# Patient Record
Sex: FEMALE | Race: WHITE
[De-identification: names, ages, dates, MRNs, and addresses within clinical notes are randomized per-mention and may not be internally consistent; named-entity substitution may affect disease eponyms.]

---

## 2016-12-22 LAB
ANION GAP SERPL CALC-SCNC: 8 MMOL/L (ref 3–11)
APPEARANCE UR: CLEAR
BASOPHILS # BLD: 0.04 K/UL (ref 0–0.2)
BASOPHILS NFR BLD: 0.5 %
BILIRUB UR-MCNC: (no result) MG/DL
BUN SERPL-MCNC: 19 MG/DL (ref 7–18)
BUN/CREAT SERPL: 15.4 (ref 10–20)
CALCIUM SERPL-MCNC: 9.1 MG/DL (ref 8.5–10.1)
CHLORIDE SERPL-SCNC: 105 MMOL/L (ref 98–107)
CO2 SERPL-SCNC: 28 MMOL/L (ref 21–32)
COLOR UR: YELLOW
COMPLETE: YES
CREAT CL PREDICTED SERPL C-G-VRATE: 31.4 ML/MIN
CREAT SERPL-MCNC: 1.2 MG/DL (ref 0.6–1.2)
EOSINOPHIL NFR BLD AUTO: 329 K/UL (ref 130–400)
GLUCOSE SERPL-MCNC: 91 MG/DL (ref 70–99)
HCT VFR BLD CALC: 37.6 % (ref 37–47)
IG%: 0.5 %
IMM GRANULOCYTES NFR BLD AUTO: 35.6 %
INR PPP: 0.9 (ref 0.9–1.1)
LYMPHOCYTES # BLD: 3.05 K/UL (ref 1.2–3.4)
MANUAL MICROSCOPIC REQUIRED?: NO
MCH RBC QN AUTO: 30.5 PG (ref 25–34)
MCHC RBC AUTO-ENTMCNC: 33 G/DL (ref 32–36)
MCV RBC AUTO: 92.6 FL (ref 80–100)
MONOCYTES NFR BLD: 11.3 %
NEUTROPHILS # BLD AUTO: 1.8 %
NEUTROPHILS NFR BLD AUTO: 50.3 %
NITRITE UR QL STRIP: (no result)
PARTIAL THROMBOPLASTIN RATIO: 1
PH UR STRIP: 7 [PH] (ref 4.5–7.5)
PMV BLD AUTO: 9.4 FL (ref 7.4–10.4)
POTASSIUM SERPL-SCNC: 4.2 MMOL/L (ref 3.5–5.1)
PROTHROMBIN TIME: 10.1 SECONDS (ref 9–12)
RBC # BLD AUTO: 4.06 M/UL (ref 4.2–5.4)
REVIEW REQ?: NO
SODIUM SERPL-SCNC: 141 MMOL/L (ref 136–145)
SP GR UR STRIP: 1.01 (ref 1–1.03)
URINE BILL WITH OR WITHOUT MIC: (no result)
URINE EPITHELIAL CELL AUTO: (no result) /LPF (ref 0–5)
UROBILINOGEN UR-MCNC: (no result) MG/DL
WBC # BLD AUTO: 8.57 K/UL (ref 4.8–10.8)

## 2016-12-22 NOTE — PAT MEDICATION INSTRUCTIONS
Service Date


Dec 22, 2016.





Current Home Medication List


Aspirin Enteric Coated (Ecotrin Or Generic *), 81 MG PO QPM


Cyclosporine (Ophth) (Restasis), 1 DROP OPB BID


Diclofenac Sodium (Topical) (Voltaren 1% Top Gel), 1 DOSE TOP BID


Escitalopram (Lexapro), 10 MG PO QAM


Fish Oil ([place]-3), 1 CAP PO BID


Ipratropium-Albuterol (Combivent Respimat), 1 PUFFS INH QID PRN for PRN


Ipratropium-Albuterol (Duoneb), 1 TREATMENT INH Q6H PRN for RN


Levothyroxine Sodium (Levothyroxine Sodium), 1 TAB PO QAM


Metoprolol Tartrate (Lopressor) (Lopressor), 25 MG PO BID


Multiple Vitamin (Multivitamin), 1 TAB PO QAM


Simvastatin (Zocor), 40 MG PO HS


Tolterodine Tartrate (Detrol LA), 1 CAP PO HS





Medication Instructions


For Your Scheduled Surgery 








- Hold the following medications 2 weeks prior to surgery:


Fish Oil ([place]-3), 1 CAP PO BID











- Hold the following medications the morning of surgery:


Multiple Vitamin (Multivitamin), 1 TAB PO QAM


Diclofenac Sodium (Topical) (Voltaren 1% Top Gel), 1 DOSE TOP BID














- Take the following medications the morning of surgery with a sip of water 

OTHERWISE NOTHING TO EAT OR DRINK AFTER MIDNIGHT:


Escitalopram (Lexapro), 10 MG PO QAM


Metoprolol Tartrate (Lopressor) (Lopressor), 25 MG PO BID


Cyclosporine (Ophth) (Restasis), 1 DROP OPB BID


Levothyroxine Sodium (Levothyroxine Sodium), 1 TAB PO QAM


Ipratropium-Albuterol (Combivent Respimat), 1 PUFFS INH QID PRN for PRN


Ipratropium-Albuterol (Duoneb), 1 TREATMENT INH Q6H PRN for RN











- Take the following medications as scheduled the night before surgery:


Simvastatin (Zocor), 40 MG PO HS


Aspirin Enteric Coated (Ecotrin Or Generic *), 81 MG PO QPM


Tolterodine Tartrate (Detrol LA), 1 CAP PO  HS


Metoprolol Tartrate (Lopressor) (Lopressor), 25 MG PO BID


Cyclosporine (Ophth) (Restasis), 1 DROP OPB BID


Ipratropium-Albuterol (Combivent Respimat), 1 PUFFS INH QID PRN for PRN


Ipratropium-Albuterol (Duoneb), 1 TREATMENT INH Q6H PRN for RN











If you have any questions please call us at [phone] (Adele Smalls PA-C

)  or [phone] or [phone]

## 2016-12-22 NOTE — DIAGNOSTIC IMAGING REPORT
CHEST PREADMISSION(PA/LAT)



CLINICAL HISTORY: Preoperative chest    



COMPARISON STUDY:  12/8/2016



FINDINGS: The heart is at the upper limits of normal in size. There is a large

air-containing retrocardiac opacity consistent with a hiatal hernia. There is no

focal pulmonary consolidation. There is no failure. There are no pleural

effusions. There are few scattered chronic interstitial type opacities.[ 



IMPRESSION: Hiatal hernia. No acute findings.







Electronically signed by:  Vito Ho M.D.

12/22/2016 11:43 AM

## 2017-01-26 NOTE — HISTORY & PHYSICAL EXAMINATION
DATE OF ADMISSION:  01/31/2017

 

CHIEF COMPLAINT:  Right knee pain, discomfort and instability.

 

HISTORY OF PRESENT ILLNESS:  The patient is an 82-year-old female, quite

independent and lives by herself, who presents for surgical treatment of her

right knee.  She has got a long history of right knee pain and discomfort and

describes it has just gotten worse over time.  She initially responded to

injections and these have become less successful over time.  She has had

several falls and her knee has become more valgus and unstable.  Shots have

become less effective and feels like her knee is going to give out and she

did not actually fall and break her kneecap at one point.  She would now like

to proceed with surgical treatment.  It is hindering her ability to maintain

an independent and active lifestyle.

 

PAST MEDICAL HISTORY:  Significant for:

1.  Elevated cholesterol.

2.  Recent pneumonia, currently resolved.

3.  Hypothyroidism.

4.  Low back pain/sciatica.

5.  Gastroesophageal reflux disease.

6.  Hiatal hernia.

 

PAST SURGICAL HISTORY:  Includes:

1.  Bilateral shoulder surgery.

2.  Back surgery.

3.  Cholecystectomy.

4.  Hysterectomy.

 

ALLERGIES:  SULFA.

 

CURRENT MEDICINES:  Include:

1.  Lexapro.

2.  Restasis ophthalmic ointment.

3.  Multivitamin.

4.  Zostavax.

5.  Fish oil. 

6.  Simvastatin.

7.  Metoprolol.

8.  Levothyroxine.

9.  Tolterodine.

 

SOCIAL HISTORY:  This is an 82-year-old female.  She lives by herself.  Lives

in [place].  Does not smoke.

 

FAMILY HISTORY:  Noncontributory.

 

REVIEW OF SYSTEMS:  Negative for diabetes, neurologic problems, vascular

problems, bleeding disorders.  Denies any chest pain or shortness of breath. 

No history of DVT or PE.  She does have a history of recurrent urinary tract

infections but her current urinalysis is negative.

 

PHYSICAL EXAMINATION:

GENERAL:  Reveals a healthy, pleasant, thin elderly female.  She looks

younger than her stated age.

HEENT:  Benign.

NECK:  Supple, no lymphadenopathy.

LUNGS:  Clear to auscultation.

HEART:  Has a regular rate and rhythm.

ABDOMEN:  Soft, nontender, nondistended.

EXTREMITIES:  Grossly neurovascularly intact except as follows:  Examination

of the right knee reveals the patient walks independently.  She does limp on

the right side.  When she weight bears, it goes into significant valgus. 

Range of motion is 5 degrees short of full extension to 120 degrees of

flexion.  She has a good straight leg raise.  No pain with hip motion.

 

X-RAYS:  X-rays of the right knee were reviewed, showed advanced right knee

DJD.  She has complete loss of her lateral joint space.  She has a

significant valgus deformity.

 

ASSESSMENT:  An 82-year-old female with advanced right knee degenerative

joint disease with valgus aligned knee and has developed progressive

arthritic change and instability over time.  It is not only painful but

unstable.  She would like to have her knee replaced.

 

PLAN:  We are going to take her to the operating room and do right total knee

replacement.  The risks and benefits of this procedure were explained to the

patient including but not limited to DVT, PE, death, infection, neurological

injury, vascular injury, bleeding problem, pain, limited range of motion,

stiffness, failure to relieve her symptoms, incomplete relief of symptoms,

need for further surgery in the future, fracture, leg length inequality,

nerve palsy, etc.  The patient understands and desires to proceed, informed

consent was obtained.

 

Undoubtedly, we are going to have to do a lateral release to straighten her

knee out.  This does increase her risk of peroneal nerve palsy.  It is very

possible we might need to put a constrained insert in due to her MCL laxity.

 

As far as discharge plans, she is planning to be discharged to [facility] as

long as she qualifies.  She will certainly need to go somewhere for rehab. 

She will take her metoprolol the morning of surgery along with the Synthroid.

## 2017-01-31 ENCOUNTER — HOSPITAL ENCOUNTER (INPATIENT)
Dept: HOSPITAL 45 - C.ACU | Age: 82
LOS: 3 days | Discharge: NURSING, REHAB OR LONG-TERM CARE | DRG: 470 | End: 2017-02-03
Attending: ORTHOPAEDIC SURGERY | Admitting: ORTHOPAEDIC SURGERY
Payer: COMMERCIAL

## 2017-01-31 VITALS
HEART RATE: 64 BPM | SYSTOLIC BLOOD PRESSURE: 146 MMHG | DIASTOLIC BLOOD PRESSURE: 68 MMHG | TEMPERATURE: 97.7 F | OXYGEN SATURATION: 96 %

## 2017-01-31 VITALS
DIASTOLIC BLOOD PRESSURE: 66 MMHG | SYSTOLIC BLOOD PRESSURE: 107 MMHG | HEART RATE: 70 BPM | OXYGEN SATURATION: 98 % | TEMPERATURE: 97.34 F

## 2017-01-31 VITALS
DIASTOLIC BLOOD PRESSURE: 67 MMHG | SYSTOLIC BLOOD PRESSURE: 110 MMHG | TEMPERATURE: 97.88 F | HEART RATE: 77 BPM | OXYGEN SATURATION: 92 %

## 2017-01-31 VITALS
OXYGEN SATURATION: 97 % | SYSTOLIC BLOOD PRESSURE: 111 MMHG | HEART RATE: 71 BPM | DIASTOLIC BLOOD PRESSURE: 68 MMHG | TEMPERATURE: 97.88 F

## 2017-01-31 VITALS
SYSTOLIC BLOOD PRESSURE: 123 MMHG | HEART RATE: 66 BPM | TEMPERATURE: 97.7 F | DIASTOLIC BLOOD PRESSURE: 78 MMHG | OXYGEN SATURATION: 100 %

## 2017-01-31 VITALS — HEART RATE: 69 BPM | SYSTOLIC BLOOD PRESSURE: 105 MMHG | OXYGEN SATURATION: 96 % | DIASTOLIC BLOOD PRESSURE: 65 MMHG

## 2017-01-31 VITALS
HEIGHT: 62 IN | HEIGHT: 62 IN | WEIGHT: 137.13 LBS | WEIGHT: 137.13 LBS | BODY MASS INDEX: 25.23 KG/M2 | BODY MASS INDEX: 25.23 KG/M2

## 2017-01-31 VITALS
HEART RATE: 69 BPM | TEMPERATURE: 97.7 F | DIASTOLIC BLOOD PRESSURE: 74 MMHG | OXYGEN SATURATION: 98 % | SYSTOLIC BLOOD PRESSURE: 114 MMHG

## 2017-01-31 VITALS
TEMPERATURE: 97.7 F | SYSTOLIC BLOOD PRESSURE: 110 MMHG | DIASTOLIC BLOOD PRESSURE: 67 MMHG | OXYGEN SATURATION: 95 % | HEART RATE: 69 BPM

## 2017-01-31 VITALS — OXYGEN SATURATION: 100 %

## 2017-01-31 DIAGNOSIS — G51.0: ICD-10-CM

## 2017-01-31 DIAGNOSIS — T39.8X5A: ICD-10-CM

## 2017-01-31 DIAGNOSIS — H04.129: ICD-10-CM

## 2017-01-31 DIAGNOSIS — M21.061: ICD-10-CM

## 2017-01-31 DIAGNOSIS — Z87.440: ICD-10-CM

## 2017-01-31 DIAGNOSIS — I10: ICD-10-CM

## 2017-01-31 DIAGNOSIS — N17.9: ICD-10-CM

## 2017-01-31 DIAGNOSIS — Z79.899: ICD-10-CM

## 2017-01-31 DIAGNOSIS — Z79.82: ICD-10-CM

## 2017-01-31 DIAGNOSIS — Z87.01: ICD-10-CM

## 2017-01-31 DIAGNOSIS — Z88.2: ICD-10-CM

## 2017-01-31 DIAGNOSIS — E78.00: ICD-10-CM

## 2017-01-31 DIAGNOSIS — M17.11: Primary | ICD-10-CM

## 2017-01-31 DIAGNOSIS — N32.89: ICD-10-CM

## 2017-01-31 DIAGNOSIS — T40.4X5A: ICD-10-CM

## 2017-01-31 DIAGNOSIS — F32.9: ICD-10-CM

## 2017-01-31 DIAGNOSIS — E03.9: ICD-10-CM

## 2017-01-31 PROCEDURE — 0SRC0J9 REPLACEMENT OF RIGHT KNEE JOINT WITH SYNTHETIC SUBSTITUTE, CEMENTED, OPEN APPROACH: ICD-10-PCS | Performed by: ORTHOPAEDIC SURGERY

## 2017-01-31 RX ADMIN — FERROUS GLUCONATE SCH MG: 324 TABLET ORAL at 17:46

## 2017-01-31 RX ADMIN — DOCUSATE SODIUM SCH MG: 100 CAPSULE, LIQUID FILLED ORAL at 21:14

## 2017-01-31 RX ADMIN — TOLTERODINE TARTRATE SCH MG: 2 CAPSULE, EXTENDED RELEASE ORAL at 21:15

## 2017-01-31 RX ADMIN — CEFAZOLIN SCH MLS/HR: 10 INJECTION, POWDER, FOR SOLUTION INTRAVENOUS at 19:36

## 2017-01-31 RX ADMIN — DEXTROSE MONOHYDRATE, SODIUM CHLORIDE, AND POTASSIUM CHLORIDE SCH MLS/HR: 50; 4.5; 1.49 INJECTION, SOLUTION INTRAVENOUS at 16:01

## 2017-01-31 RX ADMIN — KETOROLAC TROMETHAMINE SCH MG: 15 INJECTION INTRAMUSCULAR; INTRAVENOUS at 15:59

## 2017-01-31 RX ADMIN — SIMVASTATIN SCH MG: 40 TABLET, FILM COATED ORAL at 21:15

## 2017-01-31 RX ADMIN — ALUMINUM ZIRCONIUM TRICHLOROHYDREX GLY SCH EA: 0.2 STICK TOPICAL at 16:00

## 2017-01-31 RX ADMIN — METOPROLOL TARTRATE SCH MG: 25 TABLET, FILM COATED ORAL at 21:15

## 2017-01-31 RX ADMIN — ACETAMINOPHEN SCH MG: 500 TABLET, COATED ORAL at 17:47

## 2017-01-31 RX ADMIN — Medication SCH MG: at 21:14

## 2017-01-31 RX ADMIN — KETOROLAC TROMETHAMINE SCH MG: 15 INJECTION INTRAMUSCULAR; INTRAVENOUS at 22:17

## 2017-01-31 NOTE — PROGRESS NOTE
DATE: 01/31/2017

 

SUBJECTIVE:  An 82-year-old white female postop from a right knee

replacement.  She is doing well.  She does not have any pain yet.  No chest

pain or shortness of breath.  Not feeling dizzy or lightheaded.  No nausea.

 

OBJECTIVE:

VITAL SIGNS:  Temperature 36.6.  Vital signs stable.

GENERAL:  Reveals a pleasant, elderly female.  She is sitting up in bed and

looks comfortable.  She is talking with her family.

LUNGS:  Clear to auscultation.

HEART:  Regular rate and rhythm.

ABDOMEN:  Soft, nontender, nondistended.

EXTREMITIES:  Grossly neurovascularly intact except as follows:

 

Examination of right lower extremity reveals the leg to be well aligned.  She

can dorsiflex and plantarflex her foot appropriately.  She is neurologically

intact.  She has got brisk refill.

 

X-RAYS:  X-rays of the right knee from recovery room were reviewed.  It shows

a cemented posterior stabilized total knee arthroplasty.  Components looked

to be in good position.  No signs of problems.

 

ASSESSMENT:  An 82-year-old female postop from a right knee replacement,

doing well.  Her pain is controlled.  She is neurologically intact.

 

PLAN:

1.  DVT prophylaxis including thigh-high TEDs, SCDs, and aspirin twice a day.

2.  PT/OT.  Weightbearing as tolerated.  Right total knee protocol.

3.  Pain control.  Doing well with current pain regimen.  We are going to try

and limit narcotics to avoid confusion.

4.  IV antibiotics x 24 hours.

5.  Disposition:  She is hoping to be discharged to [facility] for a brief

rehab stay once medically stable.

## 2017-01-31 NOTE — MEDICAL CONSULT
Consultation


Date of Consultation:


Jan 31, 2017.


Attending Physician:


Elias Santos M.D.


Reason for Consultation:


Postoperative medical management


History of Present Illness


The patient is an 82-year-old female who underwent right total knee 

arthroplasty by Dr. Santos earlier in the day.  Seen postoperatively she has no 

complaints.  Her pain is adequately managed, and she does feel she's ready to 

eat.





Past Medical/Surgical History


Medical Problems:


(1) Failure of outpatient treatment


Status: Acute  











Family History





Patient reports no known family medical history.





Social History


Smoking Status:  Never Smoker


Smokeless Tobacco Use:  No


Alcohol Use:  none


Drug Use:  none


Marital Status:  


Housing Status:  lives alone


Occupation Status:  retired





Allergies


Coded Allergies:  


     Sulfa Antibiotics (Verified  Allergy, Unknown, UNKNOWN, 1/31/17)





Current Inpatient Medications





 Current Inpatient Medications








 Medications


  (Trade)  Dose


 Ordered  Sig/Ron


 Route  Start Time


 Stop Time Status Last Admin


Dose Admin


 


 Lactated Ringer's  1,000 ml @ 


 60 mls/hr  [id number]


 IV  1/31/17 06:00


 1/31/17 22:39  1/31/17 09:25


60 MLS/HR


 


 Cefazolin Sodium


  (Ancef 2000mg/60


 ml D5W)  60 ml @ 


 100 mls/hr  PREOP


 IV  1/31/17 06:00


 1/31/17 18:00  1/31/17 11:59


100 MLS/HR


 


 Acetaminophen


 1000 mg  1,000 mg  PREOP


 PO  1/31/17 06:00


 1/31/17 18:00  1/31/17 09:10


1,000 MG


 


 Bupivacaine


 Liposome/


 Bupivacaine HCl/


 Epinephrine


 Bitart/Sodium


 Chloride/Syringe


  (Exparel/


 BUPIVACAINE/


 EPINEPHRINE 0.25%


 Inj/Sodium


 Chloride 0.9% Pf


 Inj/Syringe)  100 ml @ 0


 mls/hr  06


 INFIL  1/31/17 06:00


 1/31/17 18:00  1/31/17 13:03


100 MLS/HR


 


 Famotidine


  (Pepcid Tab)  20 mg  PREOP


 PO  1/31/17 06:00


 1/31/17 18:00  1/31/17 09:10


20 MG


 


 Gabapentin


  (Neurontin Cap)  300 mg  PREOP


 PO  1/31/17 06:00


 1/31/17 18:00  1/31/17 09:09


300 MG


 


 Metoclopramide HCl


  (Reglan Tab)  10 mg  PREOP


 PO  1/31/17 06:00


 1/31/17 18:00  1/31/17 09:10


10 MG


 


 Miscellaneous


  (Remove


 Transderm-Scop


 Patch)  1 ea  Q72H


 N/A  2/3/17 06:00


 2/3/17 06:01   


 


 


 Miscellaneous


 Information 1 ea  1 ea  QS


 N/A  1/31/17 16:00


 2/2/17 05:59   


 


 


 Tranexamic Acid


 1000 mg/Sodium


 Chloride  110 ml @ 


 660 mls/hr  TODAY@0630


 IV  1/31/17 06:30


 1/31/17 18:00   


 


 


 Lactated Ringer's


  (Lr 1000ml)  1,000 ml @ 


 15 mls/hr  Q24H


 IV  1/31/17 06:00


 2/1/17 05:59   


 


 


 Fentanyl Citrate


  (Fentanyl Inj)  50 mcg  Q5M  PRN


 IV  1/31/17 10:45


 1/31/17 15:45   


 


 


 Hydromorphone HCl


  (Dilaudid Inj)  0.5 mg  Q5M  PRN


 IV  1/31/17 10:45


 1/31/17 15:45   


 


 


 Meperidine HCl


  (Demerol Inj)  25 mg  Q5M  PRN


 IV  1/31/17 10:45


 1/31/17 15:45   


 


 


 Ondansetron HCl


  (Zofran Inj)  4 mg  ONE  PRN


 IV  1/31/17 10:45


 1/31/17 15:45   


 


 


 Labetalol HCl


  (Normodyne IV)  5 mg  Q5M  PRN


 IV  1/31/17 10:45


 1/31/17 15:45   


 


 


 Ephedrine Sulfate


  (EpHEDrine


 SULFATE INJ)  5 mg  Q5M  PRN


 IV  1/31/17 10:45


 1/31/17 15:45   


 


 


 Atropine Sulfate


 0.5 mg  0.5 mg  Q1M  PRN


 IV  1/31/17 10:45


 1/31/17 15:45   


 


 


 Potassium


 Chloride/Dextrose/


 Sod Cl  1,000 ml @ 


 100 mls/hr  Q10H


 IV  1/31/17 15:00


 2/1/17 14:59   


 


 


 Cefazolin Sodium/


 Dextrose


  (Ancef Iv/D5


 50ml)  55 ml @ 


 100 mls/hr  Q8H


 IV  1/31/17 20:00


 2/1/17 04:32   


 


 


 Morphine Sulfate


  (MoRPHine


 SULFATE INJ)  2 mg  Q1H  PRN


 IV  1/31/17 13:45


 2/14/17 13:44   


 


 


 Acetaminophen


  (Tylenol Tab)  1,000 mg  Q8H


 PO  1/31/17 18:00


 3/2/17 17:59   


 


 


 Magnesium


 Hydroxide


  (Milk Of


 Magnesia Susp)  30 ml  Q6H  PRN


 PO  1/31/17 13:45


 3/2/17 13:44   


 


 


 Bisacodyl


  (Dulcolax Supp)  10 mg  DAILY  PRN


 CA  1/31/17 13:45


 3/2/17 13:44   


 


 


 Docusate Sodium


  (coLACE CAP)  100 mg  BID


 PO  1/31/17 21:00


 3/2/17 20:59   


 


 


 Diphenhydramine


 HCl


  (Benadryl Cap)  25 mg  Q8H  PRN


 PO  1/31/17 13:45


 3/2/17 13:44   


 


 


 Diphenhydramine


 HCl


  (Benadryl Inj)  25 mg  Q8H  PRN


 IV  1/31/17 13:45


 3/2/17 13:44   


 


 


 Al Hydrox/Mg


 Hydrox/Simethicone


  (Maalox Max Susp)  15 ml  Q4H  PRN


 PO  1/31/17 13:45


 3/2/17 13:44   


 


 


 Zolpidem Tartrate


  (Ambien Tab)  5 mg  HSZ  PRN


 PO  1/31/17 13:45


 3/2/17 13:44   


 


 


 Multivitamins


  (Multivitamin


 Tab)  1 tab  QAM


 PO  2/1/17 09:00


 3/3/17 08:59   


 


 


 Ondansetron HCl


  (Zofran Inj)  4 mg  Q6H  PRN


 IV  1/31/17 13:45


 3/2/17 13:44   


 


 


 Metoclopramide HCl


  (Reglan Inj)  10 mg  Q6H  PRN


 IV  1/31/17 13:45


 3/2/17 13:44   


 


 


 Ferrous Gluconate


  (Ferrous


 Gluconate Tab)  324 mg  TIDM


 PO  1/31/17 17:45


 3/2/17 17:59   


 


 


 Pantoprazole


 Sodium


  (Protonix Tab)  40 mg  QAM


 PO  2/1/17 09:00


 3/3/17 08:59   


 


 


 Aspirin


  (Ecotrin Tab)  325 mg  BID


 PO  1/31/17 21:00


 3/2/17 20:59   


 


 


 Tramadol HCl   1 tablet


 for pain


 rating...  Q4H  PRN


 PO  1/31/17 13:45


 3/2/17 13:44   


 


 


 Tranexamic Acid/


 Sodium Chloride


  (Cyklokapron Inj/


 Nss 100ml)  110 ml @ 


 660 mls/hr  Q6H


 IV  1/31/17 20:00


 1/31/17 20:09   


 


 


 Ketorolac


 Tromethamine


  (Toradol Inj)  15 mg  Q6H


 IV.  1/31/17 16:00


 2/2/17 15:59   


 


 


 Escitalopram


 Oxalate


  (Lexapro Tab)  10 mg  QAM


 PO  2/1/17 09:00


 3/3/17 08:59   


 


 


 Levothyroxine


 Sodium


  (Synthroid Tab)  88 mcg  DAILYBB


 PO  2/1/17 06:00


 3/3/17 05:59   


 


 


 Metoprolol


 Tartrate


  (Lopressor Tab)  25 mg  BID


 PO  1/31/17 21:00


 3/2/17 20:59   


 


 


 Simvastatin


  (Zocor Tab)  40 mg  HS


 PO  1/31/17 21:00


 3/2/17 20:59   


 


 


 Tolterodine


 Tartrate


  (Detrol LA Cap)  2 mg  HS


 PO  1/31/17 21:00


 3/2/17 20:59   


 


 


 Miscellaneous


 Information


  (Order Awaiting


 Action)  1 ea  QS


 N/A  1/31/17 16:00


 3/2/17 15:59   


 











Review of Systems


The patient denies chest pain, palpitations, shortness of breath, cough,  

vision change, hearing change, sore throat, fevers, chills, sweats, nausea, 

vomiting, abdominal pain, pelvic pain, blood in urine or stool, dysuria, 

urinary frequency or urgency, lightheadedness, dizziness, headache, memory loss

, rash, night sweats, or allergy symptoms.


The review of systems is otherwise negative other than for that already noted 

above, and at least 10 systems have been reviewed.





Physical Exam











  Date Time  Temp Pulse Resp B/P Pulse Ox O2 Delivery O2 Flow Rate FiO2


 


1/31/17 15:34 36.5 66 16 123/78 100 Nasal Cannula 2.0 


 


1/31/17 15:05 36.5 69 16 114/74 98 Nasal Cannula 2.0 


 


1/31/17 14:35     92 Nasal Cannula 2.0 


 


1/31/17 14:35 36.6 77 18 110/67 92 Nasal Cannula 2.0 


 


1/31/17 14:35      Nasal Cannula 2.0 


 


1/31/17 14:25 37.3 76 15 118/50 97 Nasal Cannula 2 


 


1/31/17 14:15  76 19 121/51 97 Nasal Cannula 2 


 


1/31/17 14:05  76 16 123/53 97 Nasal Cannula 2 


 


1/31/17 13:55 36.7 81 20 118/47 97 Nasal Cannula 2 


 


1/31/17 08:54 36.5 64 18 146/68 96 Room Air  








The patient is awake, well-developed and adequately nourished, alert and 

oriented 3, normocephalic and atraumatic, lying in bed and in no acute 

distress.


HEENT--PERRL, EOMI, mucous membranes  and oropharynx moist.


Neck--supple, no JVD or bruits, thyroid normal, trachea midline, no adenopathy.


Heart--normal S1 and S2, no extra beats, no murmurs, rubs or gallops.


Lungs--clear bilaterally with good air movement, no respiratory distress, no 

accessory muscle use.


Abdomen--normal bowel sounds and soft, nontender and nondistended, no hernias 

or masses,  no organomegaly.


Extremities--no cyanosis, clubbing or edema. There are good distal pulses b/l.


Dermatologic--normal skin turgor, normal color, warm and dry, no abnormal lymph 

nodes, no rash.


Neurologic--cranial nerves II through XII grossly intact, motor and sensory 

examination normal.


Rheumatologic--right knee wrapped.


Psychiatric--normal affect.





Assessment & Plan


Status post right total knee arthroplasty--medically stable.





Hypertension--continue metoprolol tartrate 25 mg by mouth twice a day with hold 

parameters, enteric-coated aspirin 81 mg by mouth daily.





Hypothyroidism--continue levothyroxine sodium 80 g by mouth daily.





Hypercholesterolemia--continue simvastatin 40 mg by mouth at bedtime.





Bladder spasm--continue Detrol LA 2 mg by mouth at bedtime.





Depression--continue Lexapro 10 mg by mouth every morning.





Dry eye--continue Restasis 1 drop OPB twice a day.

## 2017-01-31 NOTE — OPERATIVE REPORT
DATE OF OPERATION:  01/31/2017

 

PREOPERATIVE DIAGNOSIS:  Right knee degenerative joint disease.

 

POSTOPERATIVE DIAGNOSIS:  Same.

 

PROCEDURE PERFORMED:  Right cemented posterior stabilized total knee

arthroplasty.

 

SURGEON:  Elias Santos M.D.

 

ASSISTANT:  Will Rashid PA-C.

 

COMPLICATIONS:  None.

 

ESTIMATED BLOOD LOSS:  50 mL.

 

FLUID REPLACEMENT:  800 mL crystalloid fluid replacement.

 

TOURNIQUET TIME:  66 minutes at 300 mmHg.

 

ANESTHESIA:  Spinal with adductor canal block.

 

DRAINS:  None.

 

SPECIMENS:  Right knee sent for pathology.

 

OPERATIVE INDICATIONS:  The patient is an 82-year-old female who has had a

long history of right knee pain, discomfort and progressive deformity.  She

has been through extensive conservative treatment over  the past 10 years. 

She failed this over time.  She had more and more trouble getting around. 

Her knee has  become unstable due to the valgus nature.  The patient elected

to proceed with total knee arthroplasty.

 

Of note, the patient did report a question of metal allergy/nickel allergy,

so we elected to use the Smith \T\ Nephew zirconium total knee arthroplasty.

 

OPERATIVE FINDINGS:  Operative findings revealed advanced right knee DJD. 

She had grade 3 bone-on-bone disease in all 3 compartments, most severe in

the lateral side.  She had eburnation of the lateral femoral condyle.  She

had malrotated distal femur.  She had a dysplastic lateral femoral condyle. 

She had severe eburnation of the lateral tibial plateau.

 

OPERATIVE IMPLANTS:  Operative implants consisted of:

1. Iqbal & Nephew Journey II Zirconium size 6 posterior stabilized femoral 
component.

2. Smith & Nephew size 4 tibial tray.

3. A 12 mm posterior stabilized polyethylene insert.

4. A 32 x 9 all poly patella.

 

OPERATIVE PROCEDURE:  The patient taken to the operating room, identified and

placed on the operating table in supine position.  All contact areas were

appropriately padded.  IV antibiotics were provided by the anesthesia team. 

A Tsai catheter was placed in sterile fashion.  A right thigh tourniquet was

then placed.  The right lower extremity was then prepped and draped in usual

sterile fashion.  The right leg was elevated and exsanguinated with Esmarch

and tourniquet was placed at 300 mmHg.  An anterior approach to the right

knee was then performed through a longitudinal incision centered over the

patella.  Sharp dissection was carried through the subcutaneous tissues down

to the level of the extensor mechanism.  A medial parapatellar arthrotomy

incision was made.  Some subperiosteal dissection was carried out medially. 

The fat pad was resected from beneath the patellar tendon.  The lateral

patellofemoral ligament was released.  The patella was everted and the knee

was flexed.  The osteophytes were taken off the distal femur.  Her ACL was

chronically absent.  Her tibia was subluxated anteriorly.  The external

tibial alignment jig was then placed in the anterior face of the tibia and

adjusted 8 mm medially.  Proximal tibial cut was made to remove about 2-3 mm

of bone from the medial side.  Tibia was sized to a size 4.  Attention was

then drawn to the femur.

 

The distal femur was entered with a sharp drill bit.  Intramedullary canal

was suctioned.  A right 5 degree valgus cutting guide was placed.  Distal

femoral cutting block was pinned in place.  We did eventually adjust the

distal femoral cutting block to take an additional 4 mm off the distal femur

as it was not taking hardly any bone off the lateral femoral condyle and cut

to the base of the intercondylar notch area.  Distal femoral cut was made.  I

brought the knee out in extension.  I did do just a little bit pie crusting

of the IT band and the posterolateral capsule, taking great care to protect

the peroneal nerve at all times.  Her  lateral side was not excessively

tight.  The knee was then flexed.  The femur was then sized to a size 6.  We

did downsize this slightly.  The AP cutting  block was pinned parallel to the

epicondylar axis, which was 6 degrees of external rotation.  The anterior

cut, anterior  cord cut, posterior cut, posterior chamfer cut, anterior

chamfer cuts were then made.  The knee was flexed.  The remnants of the

medial and lateral meniscus were excised.  The osteophytes were taken off the

posterior aspect of the femur.  A trial femoral component was then placed. 

The box was created for the box of  the femoral component.  Trochlear

component was placed.  Tibial tray was pinned in maximum external rotation

and the punch was used for the tibial tray.  We then trialed the knee and the

12 mm insert fit most appropriately.  Attention was then drawn to the

patella.

 

The patella was cleaned of all soft tissues.  Patellar thickness measured 24

mm and was cut down to about 15.  I wanted  to leave this a little thick as

her bone was pretty soft.  It was sized to a size 32 patella.  The lug holes

were drilled for a 32 patella.  Lateral osteophyte was removed.  Patella

button was placed.  Knee was taken through range of motion and patella

tracked nicely with no thumbs test.  Attention was then drawn toward

placement of the permanent components.  All trial components were removed.  A

bone plug was placed in the distal femur to limit blood loss.  A double batch

of Palacos G cement was mixed.  A right size 6 posterior stabilized  femoral

component was then placed followed by a size 4 tibial tray, 12 mm posterior

stabilized polyethylene insert, and a 32/9 all poly patella.  The knee was

brought out into full extension until cement hardened.  A final cement check

was then performed.  The pericapsular tissues were injected 100 mL of a

combination of 20 mL of Exparel, 30 mL of normal saline, 50 mL of 0.25%

Marcaine with epinephrine.  The patient did receive 1 gram of tranexamic

acid.  The tourniquet was then let down for final tourniquet time of 66

minutes.  Hemostasis was assured with use of electrocautery.  The wound was

once again irrigated.  The extensor mechanism was then closed with a

combination of #1 PDS suture and #1 Vicryl suture in a figure-of-eight

fashion.  Extensor mechanism was checked and found to be intact. 

Subcutaneous tissues were then closed with 2-0 Dexon suture in a buried

interrupted fashion.  Skin was closed skin staples.  Leg was then cleaned and

dried and a sterile dressing with Xeroform, 4 x 4, sterile cast padding and

Ace bandage were applied.  The patient then transferred to the recovery room

in stable condition.  The patient tolerated the procedure well with no

complications.  All needle and sponge counts were correct at the end of the

operation.

 

 

I attest to the content of the Intraoperative Record and any orders documented 
therein. Any exceptions are noted below.

 

 

 

MTDD

## 2017-01-31 NOTE — DIAGNOSTIC IMAGING REPORT
RIGHT KNEE 1 OR 2 VIEWS ROUTINE



CLINICAL HISTORY: AP/LATERAL IN PACU RIGHT KNEE

Right postoperative evaluation



COMPARISON: None.



DISCUSSION: Status post total right knee replacement. Good contact between

prosthetic and underlying bone. Expected soft tissue postoperative change.



IMPRESSION: Anatomic alignment status post total right knee replacement.







Electronically signed by:  Joselito Caruso M.D.

1/31/2017 2:37 PM



Dictated Date/Time:  1/31/2017 2:36 PM

## 2017-01-31 NOTE — ANESTHESIOLOGY PROGRESS NOTE
Anesthesia Post Op Note


Date & Time


Jan 31, 2017 at 14:21





Vital Signs


Pain Intensity:  0





 Vital Signs Past 12 Hours








  Date Time  Temp Pulse Resp B/P Pulse Ox O2 Delivery O2 Flow Rate FiO2


 


1/31/17 14:15  76 19 121/51 97 Nasal Cannula 2 


 


1/31/17 14:05  76 16 123/53 97 Nasal Cannula 2 


 


1/31/17 13:55 36.7 81 20 118/47 97 Nasal Cannula 2 


 


1/31/17 08:54 36.5 64 18 146/68 96 Room Air  











Notes


Mental Status:  alert / awake / arousable, participated in evaluation


Pt Amnestic to Procedure:  Yes


Nausea / Vomiting:  adequately controlled


Pain:  adequately controlled


Airway Patency, RR, SpO2:  stable & adequate


BP & HR:  stable & adequate


Hydration State:  stable & adequate


Neuraxial Anesthesia:  was administered, sensory block is resolving


Anesthetic Complications:  no major complications apparent

## 2017-01-31 NOTE — [facility] POST OPERATIVE BRIEF NOTE
Immediate Operative Summary


Operative Date


Jan 31, 2017.





Pre-Operative Diagnosis





Right knee degenerative joint disease





Post-Operative Diagnosis





Right knee degenerative joint disease





Procedure(s) Performed





Right total knee arthroplasty





Surgeon


Dr. Elias Santos





Assistant Surgeon(s)


Will Rashid PA-C





Estimated Blood Loss


50 cc





Findings





Right Knee DJD





Fluids (cc crystalloids)


800 cc





Specimens





A. Right knee bone and tissue





Drains


None





Anesthesia


Spinal





Complication(s)


None





Disposition


Recovery Room / PACU

## 2017-02-01 VITALS — SYSTOLIC BLOOD PRESSURE: 147 MMHG | DIASTOLIC BLOOD PRESSURE: 75 MMHG | HEART RATE: 73 BPM

## 2017-02-01 VITALS
TEMPERATURE: 98.24 F | DIASTOLIC BLOOD PRESSURE: 80 MMHG | HEART RATE: 73 BPM | OXYGEN SATURATION: 95 % | SYSTOLIC BLOOD PRESSURE: 151 MMHG

## 2017-02-01 VITALS
OXYGEN SATURATION: 95 % | TEMPERATURE: 97.7 F | HEART RATE: 67 BPM | SYSTOLIC BLOOD PRESSURE: 113 MMHG | DIASTOLIC BLOOD PRESSURE: 64 MMHG

## 2017-02-01 VITALS
SYSTOLIC BLOOD PRESSURE: 172 MMHG | DIASTOLIC BLOOD PRESSURE: 80 MMHG | OXYGEN SATURATION: 93 % | HEART RATE: 76 BPM | TEMPERATURE: 98.24 F

## 2017-02-01 VITALS
SYSTOLIC BLOOD PRESSURE: 121 MMHG | OXYGEN SATURATION: 96 % | DIASTOLIC BLOOD PRESSURE: 76 MMHG | HEART RATE: 72 BPM | TEMPERATURE: 97.7 F

## 2017-02-01 VITALS
DIASTOLIC BLOOD PRESSURE: 67 MMHG | OXYGEN SATURATION: 96 % | SYSTOLIC BLOOD PRESSURE: 107 MMHG | HEART RATE: 65 BPM | TEMPERATURE: 97.7 F

## 2017-02-01 LAB
ANION GAP SERPL CALC-SCNC: 9 MMOL/L (ref 3–11)
APPEARANCE UR: CLEAR
BILIRUB UR-MCNC: (no result) MG/DL
BUN SERPL-MCNC: 25 MG/DL (ref 7–18)
BUN/CREAT SERPL: 16.7 (ref 10–20)
CALCIUM SERPL-MCNC: 7.6 MG/DL (ref 8.5–10.1)
CHLORIDE SERPL-SCNC: 109 MMOL/L (ref 98–107)
CO2 SERPL-SCNC: 25 MMOL/L (ref 21–32)
COLOR UR: YELLOW
CREAT CL PREDICTED SERPL C-G-VRATE: 25.1 ML/MIN
CREAT SERPL-MCNC: 1.5 MG/DL (ref 0.6–1.2)
EOSINOPHIL NFR BLD AUTO: 208 K/UL (ref 130–400)
GLUCOSE SERPL-MCNC: 107 MG/DL (ref 70–99)
HCT VFR BLD CALC: 31.2 % (ref 37–47)
MANUAL MICROSCOPIC REQUIRED?: NO
MCH RBC QN AUTO: 30.2 PG (ref 25–34)
MCHC RBC AUTO-ENTMCNC: 33 G/DL (ref 32–36)
MCV RBC AUTO: 91.5 FL (ref 80–100)
NITRITE UR QL STRIP: (no result)
PH UR STRIP: 5 [PH] (ref 4.5–7.5)
PMV BLD AUTO: 9.5 FL (ref 7.4–10.4)
POTASSIUM SERPL-SCNC: 4.2 MMOL/L (ref 3.5–5.1)
RBC # BLD AUTO: 3.41 M/UL (ref 4.2–5.4)
REVIEW REQ?: NO
SODIUM SERPL-SCNC: 143 MMOL/L (ref 136–145)
SP GR UR STRIP: 1.01 (ref 1–1.03)
URINE BILL WITH OR WITHOUT MIC: (no result)
URINE EPITHELIAL CELL AUTO: >30 /LPF (ref 0–5)
UROBILINOGEN UR-MCNC: (no result) MG/DL
WBC # BLD AUTO: 7.29 K/UL (ref 4.8–10.8)

## 2017-02-01 RX ADMIN — SODIUM CHLORIDE SCH MLS/HR: 900 INJECTION, SOLUTION INTRAVENOUS at 20:49

## 2017-02-01 RX ADMIN — TRAMADOL HYDROCHLORIDE PRN MG: 50 TABLET, COATED ORAL at 12:37

## 2017-02-01 RX ADMIN — ALUMINUM ZIRCONIUM TRICHLOROHYDREX GLY SCH EA: 0.2 STICK TOPICAL at 16:00

## 2017-02-01 RX ADMIN — Medication SCH MG: at 08:34

## 2017-02-01 RX ADMIN — TRAMADOL HYDROCHLORIDE PRN MG: 50 TABLET, COATED ORAL at 08:32

## 2017-02-01 RX ADMIN — ALUMINUM ZIRCONIUM TRICHLOROHYDREX GLY SCH EA: 0.2 STICK TOPICAL at 00:00

## 2017-02-01 RX ADMIN — METOPROLOL TARTRATE SCH MG: 25 TABLET, FILM COATED ORAL at 20:49

## 2017-02-01 RX ADMIN — TOLTERODINE TARTRATE SCH MG: 2 CAPSULE, EXTENDED RELEASE ORAL at 21:21

## 2017-02-01 RX ADMIN — DEXTROSE MONOHYDRATE, SODIUM CHLORIDE, AND POTASSIUM CHLORIDE SCH MLS/HR: 50; 4.5; 1.49 INJECTION, SOLUTION INTRAVENOUS at 10:53

## 2017-02-01 RX ADMIN — ACETAMINOPHEN SCH MG: 500 TABLET, COATED ORAL at 17:37

## 2017-02-01 RX ADMIN — Medication SCH TAB: at 08:33

## 2017-02-01 RX ADMIN — ALUMINUM ZIRCONIUM TRICHLOROHYDREX GLY SCH EA: 0.2 STICK TOPICAL at 08:00

## 2017-02-01 RX ADMIN — PANTOPRAZOLE SCH MG: 40 TABLET, DELAYED RELEASE ORAL at 08:34

## 2017-02-01 RX ADMIN — METOPROLOL TARTRATE SCH MG: 25 TABLET, FILM COATED ORAL at 08:35

## 2017-02-01 RX ADMIN — ACETAMINOPHEN SCH MG: 500 TABLET, COATED ORAL at 10:20

## 2017-02-01 RX ADMIN — DOCUSATE SODIUM SCH MG: 100 CAPSULE, LIQUID FILLED ORAL at 08:34

## 2017-02-01 RX ADMIN — DOCUSATE SODIUM SCH MG: 100 CAPSULE, LIQUID FILLED ORAL at 20:48

## 2017-02-01 RX ADMIN — KETOROLAC TROMETHAMINE SCH MG: 15 INJECTION INTRAMUSCULAR; INTRAVENOUS at 04:29

## 2017-02-01 RX ADMIN — ESCITALOPRAM OXALATE SCH MG: 10 TABLET, FILM COATED ORAL at 08:34

## 2017-02-01 RX ADMIN — TRAMADOL HYDROCHLORIDE PRN MG: 50 TABLET, COATED ORAL at 16:46

## 2017-02-01 RX ADMIN — LEVOTHYROXINE SODIUM SCH MCG: 88 TABLET ORAL at 06:01

## 2017-02-01 RX ADMIN — FERROUS GLUCONATE SCH MG: 324 TABLET ORAL at 16:47

## 2017-02-01 RX ADMIN — FERROUS GLUCONATE SCH MG: 324 TABLET ORAL at 12:37

## 2017-02-01 RX ADMIN — TRAMADOL HYDROCHLORIDE PRN MG: 50 TABLET, COATED ORAL at 20:49

## 2017-02-01 RX ADMIN — DEXTROSE MONOHYDRATE, SODIUM CHLORIDE, AND POTASSIUM CHLORIDE SCH MLS/HR: 50; 4.5; 1.49 INJECTION, SOLUTION INTRAVENOUS at 02:18

## 2017-02-01 RX ADMIN — Medication SCH MG: at 20:48

## 2017-02-01 RX ADMIN — ACETAMINOPHEN SCH MG: 500 TABLET, COATED ORAL at 02:18

## 2017-02-01 RX ADMIN — CEFAZOLIN SCH MLS/HR: 10 INJECTION, POWDER, FOR SOLUTION INTRAVENOUS at 04:29

## 2017-02-01 RX ADMIN — SIMVASTATIN SCH MG: 40 TABLET, FILM COATED ORAL at 21:21

## 2017-02-01 RX ADMIN — SODIUM CHLORIDE SCH MLS/HR: 900 INJECTION, SOLUTION INTRAVENOUS at 08:41

## 2017-02-01 RX ADMIN — FERROUS GLUCONATE SCH MG: 324 TABLET ORAL at 08:34

## 2017-02-01 NOTE — DISCHARGE INSTRUCTIONS
Discharge Instructions


Admission


Reason for Admission:  Right Knee Osteoarthritis





Discharge


Discharge Diagnosis / Problem:  Right Knee Replacement





Discharge Goals


Goal(s):  Decrease discomfort, Improve function, Increase independence, Improve 

disease control, Therapeutic intervention





Activity Recommendations


Activity Level:  Assistance Required


Therapies:  Physical Therapy, Occupational Therapy


Weightbearing Status:  Right weightbearing





.





Additional Information


Patient informed of condition:  Yes


Advance Directives:  No


DNR:  No


Level of Care:  Acute Rehab


Communicable Disease:  No


Prognosis:  Improving





Instructions / Follow-Up


Instructions / Follow-Up





ACTIVITY RECOMMENDATIONS:





Physical Therapy:





*  You will go to physical therapy three times each week for four to six weeks 

after


    your surgery in order to regain your knee range of motion and to retrain 

your knee


    to work properly.  


*  It is just as important to make sure you are getting your knee perfectly 

straight as


    it is to regain your knee bend.


*  Taking a pain pill an hour before therapy can help you have a more 

productive and 


    comfortable therapy session.





Home Exercise:





*  You were shown a series of exercises (heel props, heel slides, etc.) in the 

hospital.


    Do these exercises three to four times each day including the exercises you 

were 


    shown in physical therapy.





Walking:





*  Get up and walk several times each day.  For the first four weeks, try not 

to stand or


    walk for more than one hour at a time.  If you do stand or walk for more 

than one hour,


    you will not hurt anything, but your knee and leg will likely swell.  


*  As you feel comfortable, you may change from the walker or crutches to a 

cane and 


    then to independent walking.








MEDICATIONS:





New Medicine:  





*  You will likely be taking one or more of these medications:


   1.  Tramadol - A quick and shorter-acting pain medication.  Take one to two


        tablets every four to six hours to lessen your pain.


   2.  Iron Sulfate - Take three times each day for the month after surgery to 


        help you replace the blood lost during surgery.


   3.  Aspirin - Thins your blood to lessen the chance of forming a blood clot.





*  The most common side effects of pain medicine and iron are nausea and 

constipation.


    If nausea or constipation is too much of a problem or if you have any 

questions about


    your new medicines or doses, call [facility] at [phone].  We


    will try to help you manage these issues.





VERY IMPORTANT TO READ AND REVIEW"





Pain:





*  The immediate post-operative period after knee replacement surgery is often 

quite painful.


*  You are given a prescription for pain medicine.  You should take it, as 

directed, when you 


    need it, especially before physical therapy and before going to bed.  Pain 

that interferes


    with sleep is very common and can last several months.


*  You will likely need pain medicine for the first four to six weeks.  It will 

not stop all of the


    pain.  The pain will lessen and as you feel better, you may change to 

milder pain medicine


    such as Tylenol.  


*  The most common side effects of pain medicine are nausea and constipation, 

so don't take


    more than you need.








SPECIAL CARE INSTRUCTIONS:





TEDs/Elastic Stockings:





*  The white elastic stockings help limit swelling and prevent blood clots from


    forming in your legs.  The more you wear them, the more they work.


*  Wear them for six weeks after knee replacement surgery and four weeks


    after partial knee replacement.





Prevention of Infection:





*  Take antibiotics one hour before any dental cleaning, dental work, urological


    procedure, gastrointestinal procedure or any invasive surgery in order to


    prevent your new joint from getting infected.  


*  You may get the antibiotics from the doctor performing the procedure or you 


    may call our office at [phone] before and we will call in a 

prescription 


    to the pharmacy of your choice.





Things to Watch For:





*  Drainage from the incision site that occurs more than one week after your 

surgery.


*  Severely increased knee/leg pain or swelling.


*  Increased redness at the incision site.


*  Fever above 102 degrees Fahrenheit.


*  Unusual chest pain or shortness of breath.


*  Unusual pain or burning with urination.





Call [facility] at [phone] with any of the above problems 

or 


if you have any questions about your medicines or recovery.








FOLLOW UP VISIT:





Make an appointment to see your doctor for approximately two weeks after surgery


for a progress check and staple removal by calling the office at [phone].








Current Hospital Diet


Patient's current hospital diet: Regular Diet





Discharge Diet


Recommended Diet:  Regular Diet





Procedures


Procedures Performed:  


Right total knee arthroplasty





Pending Studies


Studies pending at discharge:  no





Medical Emergencies








.


Who to Call and When:





Medical Emergencies:  If at any time you feel your situation is an emergency, 

please call [phone] immediately.





.





Non-Emergent Contact


Non-Emergency issues call your:  Surgeon





.


.





"Provider Documentation" section prepared by Elias Santos.





Core Measure Problem


Core Measures:  None

## 2017-02-01 NOTE — ANESTHESIOLOGY PROGRESS NOTE
Anesthesia Post Op Note


Date & Time


Feb 1, 2017 at 13:53





Vital Signs


Pain Intensity:  5.0





 Vital Signs Past 12 Hours








  Date Time  Temp Pulse Resp B/P Pulse Ox O2 Delivery O2 Flow Rate FiO2


 


2/1/17 11:50 36.5 72 16 121/76 96 Room Air  


 


2/1/17 07:21 36.5 67 16 113/64 95 Room Air  


 


2/1/17 07:15      Room Air  


 


2/1/17 03:23 36.5 65 16 107/67 96 Room Air  











Notes


Mental Status:  alert / awake / arousable, participated in evaluation


Pt Amnestic to Procedure:  Yes


Nausea / Vomiting:  adequately controlled


Pain:  adequately controlled


Airway Patency, RR, SpO2:  stable & adequate


BP & HR:  stable & adequate


Hydration State:  stable & adequate


Neuraxial Anesthesia:  sensory block resolved


Anesthetic Complications:  no major complications apparent

## 2017-02-01 NOTE — PROGRESS NOTE
DATE: 02/01/2017

 

DATE:  02/01/2017.  

 

SUBJECTIVE:  This 82-year-old white female postop day 1 from a right knee

replacement.  She is doing well.  Pain is controlled.  No chest pain or

shortness of breath.  Not feeling dizzy or lightheaded.

 

OBJECTIVE:  VITAL SIGNS:  Temperature 36.5.  Vital signs stable.

 

PHYSICAL EXAMINATION:

GENERAL:  Reveals a healthy pleasant elderly female.  She is lying in bed and

looks quite comfortable.

LUNGS:  Clear to auscultation.

HEART:  Regular rate and rhythm.

ABDOMEN:  Soft, nontender, nondistended.

EXTREMITY EXAMINATION:  Grossly neurovascularly intact except as follows:

Examination of the right lower extremity reveals the leg to be well aligned. 

Dressing is clean, dry and intact.  She can dorsiflex and plantarflex her

foot appropriately.  She is neurologically intact.

 

LABORATORY DATA:  Hemoglobin 10.3, hematocrit 31.2.  Electrolytes are

relatively stable.  Creatinine is elevated at 1.50.

 

ASSESSMENT:  An 82-year-old female postop day 1 from a right total knee

replacement, doing pretty well.  Pain is controlled.  Creatinine is slightly

elevated.  We are going to stop her Toradol.

 

PLAN:

1. DVT prophylaxis including thigh-high TEDs, SCDs, and aspirin twice a day.

2. PT/OT.  Weightbearing as tolerated.  Right total knee protocol.

3. Pain control.  Doing pretty well with current pain regimen.  We are going

to stop her Toradol due to her elevated creatinine and we will follow this

along.  I will continue to use Tylenol and tramadol if  needed.

4. Disposition.  She is hoping to be discharged to [facility] for a brief

rehab stay once stable.

## 2017-02-01 NOTE — HOSPITALIST PROGRESS NOTE
Hospitalist Progress Note


Date of Service


Feb 1, 2017.


 (Violetta Noble PA-C)





Subjective


Pt evaluation today including:  conversation w/ patient, physical exam, chart 

review, lab review, review of inpatient medication list


Patient reports mild to moderate pain in the knee.  Has no other complaints.  

Urinating without difficulty or pain.  Has not yet passed gas.  No bowel 

movements yet.  No chest pain, shortness of breath, heart palpitations or 

dizziness.





   Additional Comments:


6 system review negative. Please see pertinent positives in the history of 

present illness section.


 (Violetta Noble PA-C)





Objective


Vital Signs











  Date Time  Temp Pulse Resp B/P Pulse Ox O2 Delivery O2 Flow Rate FiO2


 


2/1/17 07:21 36.5 67 16 113/64 95 Room Air  


 


2/1/17 07:15      Room Air  


 


2/1/17 03:23 36.5 65 16 107/67 96 Room Air  


 


2/1/17 00:30      Room Air  


 


1/31/17 23:28 36.5 69 16 110/67 95 Room Air  


 


1/31/17 19:00 36.3 70 16 107/66 98 Room Air  


 


1/31/17 17:34  69 16 105/65 96 Room Air  


 


1/31/17 16:35 36.6 71 16 111/68 97 Nasal Cannula 2.0 


 


1/31/17 15:34 36.5 66 16 123/78 100 Nasal Cannula 2.0 


 


1/31/17 15:30     100 Nasal Cannula 2.0 


 


1/31/17 15:05 36.5 69 16 114/74 98 Nasal Cannula 2.0 


 


1/31/17 14:35     92 Nasal Cannula 2.0 


 


1/31/17 14:35 36.6 77 18 110/67 92 Nasal Cannula 2.0 


 


1/31/17 14:35      Nasal Cannula 2.0 


 


1/31/17 14:25 37.3 76 15 118/50 97 Nasal Cannula 2 


 


1/31/17 14:15  76 19 121/51 97 Nasal Cannula 2 


 


1/31/17 14:05  76 16 123/53 97 Nasal Cannula 2 


 


1/31/17 13:55 36.7 81 20 118/47 97 Nasal Cannula 2 








 (Violetta Noble PA-C)





Physical Exam


General Appearance:  no apparent distress


Eyes:  EOMI


Neck:  no JVD


Respiratory/Chest:  lungs clear


Cardiovascular:  regular rate, rhythm


Abdomen:  normal bowel sounds, non tender, soft


Extremities:  no pedal edema (no edema noted in the left lower extremity.  

Right lower extremity bandaged.)


Neurologic/Psychiatric:  oriented x 3, + pertinent finding (left facial droop 

noted)


Skin:  warm/dry


 (Violetta Noble PA-C)





Laboratory Results





Last 24 Hours








Test


  2/1/17


05:24


 


White Blood Count 7.29 K/uL 


 


Red Blood Count 3.41 M/uL 


 


Hemoglobin 10.3 g/dL 


 


Hematocrit 31.2 % 


 


Mean Corpuscular Volume 91.5 fL 


 


Mean Corpuscular Hemoglobin 30.2 pg 


 


Mean Corpuscular Hemoglobin


Concent 33.0 g/dl 


 


 


RDW Standard Deviation 49.1 fL 


 


RDW Coefficient of Variation 14.7 % 


 


Platelet Count 208 K/uL 


 


Mean Platelet Volume 9.5 fL 


 


Sodium Level 143 mmol/L 


 


Potassium Level 4.2 mmol/L 


 


Chloride Level 109 mmol/L 


 


Carbon Dioxide Level 25 mmol/L 


 


Anion Gap 9.0 mmol/L 


 


Blood Urea Nitrogen 25 mg/dl 


 


Creatinine 1.50 mg/dl 


 


Est Creatinine Clear Calc


Drug Dose 25.1 ml/min 


 


 


Estimated GFR (


American) 37.2 


 


 


Estimated GFR (Non-


American 32.1 


 


 


BUN/Creatinine Ratio 16.7 


 


Random Glucose 107 mg/dl 


 


Calcium Level 7.6 mg/dl 








 (Violetta Noble PA-C)





Assessment and Plan


82-year-old female status post right TKA-postop day #1


-pain management, DVT prophylaxis, PT per primary team





Mild acute renal insufficiency-likely secondary to dehydration


-change IVF to NS @ 80 cc/hr x 1.5 liters


-check UA


-PRP in am





Hypertension


-Continue Lopressor 25 mg twice daily





Urinary incontinence


-Continue Detrol 2 mg at night





Hyperlipidemia


-Continue Zocor 40 mg at night





Hypothyroidism


-Continue Synthroid 88 g daily





Depression


-Continue Lexapro 10 mg in the morning





History of Bell's palsy-facial droop noted.  Chronic





Thank you for allowing us to participate in Ms. Macario's care.


 (Violetta Noble PA-C)


Patient seen and examined.  Case was discussed with Violetta Noble PA-C.  I 

agree with her note above.  Patient has no complaints.  On exam, lungs clear 

and heart regular.  Cr up slightly, possibly from NSAID or very mild 

hypovolemia.  Will hydrate overnight and recheck BMP in morning.


 (Nacho Madden MD)

## 2017-02-02 VITALS — HEART RATE: 71 BPM | OXYGEN SATURATION: 92 % | SYSTOLIC BLOOD PRESSURE: 166 MMHG | DIASTOLIC BLOOD PRESSURE: 82 MMHG

## 2017-02-02 VITALS — DIASTOLIC BLOOD PRESSURE: 78 MMHG | SYSTOLIC BLOOD PRESSURE: 133 MMHG | HEART RATE: 73 BPM

## 2017-02-02 VITALS
OXYGEN SATURATION: 94 % | SYSTOLIC BLOOD PRESSURE: 156 MMHG | TEMPERATURE: 98.6 F | DIASTOLIC BLOOD PRESSURE: 84 MMHG | HEART RATE: 74 BPM

## 2017-02-02 VITALS
DIASTOLIC BLOOD PRESSURE: 67 MMHG | OXYGEN SATURATION: 94 % | HEART RATE: 70 BPM | TEMPERATURE: 98.42 F | SYSTOLIC BLOOD PRESSURE: 124 MMHG

## 2017-02-02 VITALS — HEART RATE: 79 BPM | DIASTOLIC BLOOD PRESSURE: 82 MMHG | OXYGEN SATURATION: 97 % | SYSTOLIC BLOOD PRESSURE: 172 MMHG

## 2017-02-02 VITALS
TEMPERATURE: 98.24 F | SYSTOLIC BLOOD PRESSURE: 106 MMHG | DIASTOLIC BLOOD PRESSURE: 64 MMHG | HEART RATE: 77 BPM | OXYGEN SATURATION: 95 %

## 2017-02-02 LAB
ANION GAP SERPL CALC-SCNC: 6 MMOL/L (ref 3–11)
BUN SERPL-MCNC: 21 MG/DL (ref 7–18)
BUN/CREAT SERPL: 17.6 (ref 10–20)
CALCIUM SERPL-MCNC: 8.2 MG/DL (ref 8.5–10.1)
CHLORIDE SERPL-SCNC: 102 MMOL/L (ref 98–107)
CO2 SERPL-SCNC: 26 MMOL/L (ref 21–32)
CREAT CL PREDICTED SERPL C-G-VRATE: 31.4 ML/MIN
CREAT SERPL-MCNC: 1.2 MG/DL (ref 0.6–1.2)
GLUCOSE SERPL-MCNC: 145 MG/DL (ref 70–99)
POTASSIUM SERPL-SCNC: 4.1 MMOL/L (ref 3.5–5.1)
SODIUM SERPL-SCNC: 134 MMOL/L (ref 136–145)

## 2017-02-02 RX ADMIN — ACETAMINOPHEN SCH MG: 500 TABLET, COATED ORAL at 01:45

## 2017-02-02 RX ADMIN — ALUMINUM ZIRCONIUM TRICHLOROHYDREX GLY SCH EA: 0.2 STICK TOPICAL at 23:45

## 2017-02-02 RX ADMIN — PANTOPRAZOLE SCH MG: 40 TABLET, DELAYED RELEASE ORAL at 07:23

## 2017-02-02 RX ADMIN — ALUMINUM ZIRCONIUM TRICHLOROHYDREX GLY SCH EA: 0.2 STICK TOPICAL at 00:00

## 2017-02-02 RX ADMIN — TOLTERODINE TARTRATE SCH MG: 2 CAPSULE, EXTENDED RELEASE ORAL at 20:49

## 2017-02-02 RX ADMIN — METOPROLOL TARTRATE SCH MG: 25 TABLET, FILM COATED ORAL at 08:13

## 2017-02-02 RX ADMIN — ESCITALOPRAM OXALATE SCH MG: 10 TABLET, FILM COATED ORAL at 07:23

## 2017-02-02 RX ADMIN — DOCUSATE SODIUM SCH MG: 100 CAPSULE, LIQUID FILLED ORAL at 08:14

## 2017-02-02 RX ADMIN — KETOROLAC TROMETHAMINE SCH MG: 15 INJECTION INTRAMUSCULAR; INTRAVENOUS at 10:04

## 2017-02-02 RX ADMIN — ACETAMINOPHEN SCH MG: 500 TABLET, COATED ORAL at 18:23

## 2017-02-02 RX ADMIN — ALUMINUM ZIRCONIUM TRICHLOROHYDREX GLY SCH EA: 0.2 STICK TOPICAL at 07:22

## 2017-02-02 RX ADMIN — DOCUSATE SODIUM SCH MG: 100 CAPSULE, LIQUID FILLED ORAL at 20:48

## 2017-02-02 RX ADMIN — Medication SCH MG: at 08:14

## 2017-02-02 RX ADMIN — FERROUS GLUCONATE SCH MG: 324 TABLET ORAL at 12:54

## 2017-02-02 RX ADMIN — LEVOTHYROXINE SODIUM SCH MCG: 88 TABLET ORAL at 05:29

## 2017-02-02 RX ADMIN — TRAMADOL HYDROCHLORIDE PRN MG: 50 TABLET, COATED ORAL at 07:22

## 2017-02-02 RX ADMIN — KETOROLAC TROMETHAMINE SCH MG: 15 INJECTION INTRAMUSCULAR; INTRAVENOUS at 20:48

## 2017-02-02 RX ADMIN — ALUMINUM ZIRCONIUM TRICHLOROHYDREX GLY SCH EA: 0.2 STICK TOPICAL at 16:00

## 2017-02-02 RX ADMIN — TRAMADOL HYDROCHLORIDE PRN MG: 50 TABLET, COATED ORAL at 01:42

## 2017-02-02 RX ADMIN — Medication SCH MG: at 20:49

## 2017-02-02 RX ADMIN — FERROUS GLUCONATE SCH MG: 324 TABLET ORAL at 07:23

## 2017-02-02 RX ADMIN — ACETAMINOPHEN SCH MG: 500 TABLET, COATED ORAL at 12:54

## 2017-02-02 RX ADMIN — ONDANSETRON PRN MG: 2 INJECTION INTRAMUSCULAR; INTRAVENOUS at 10:04

## 2017-02-02 RX ADMIN — FERROUS GLUCONATE SCH MG: 324 TABLET ORAL at 18:22

## 2017-02-02 RX ADMIN — METOPROLOL TARTRATE SCH MG: 25 TABLET, FILM COATED ORAL at 20:52

## 2017-02-02 RX ADMIN — Medication SCH TAB: at 07:22

## 2017-02-02 RX ADMIN — SIMVASTATIN SCH MG: 40 TABLET, FILM COATED ORAL at 20:50

## 2017-02-02 RX ADMIN — KETOROLAC TROMETHAMINE SCH MG: 15 INJECTION INTRAMUSCULAR; INTRAVENOUS at 14:33

## 2017-02-02 NOTE — PROGRESS NOTE
DATE: 02/02/2017

 

SUBJECTIVE:  An 82-year-old white female postop day 2 from right total knee

replacement.  She is doing pretty well.  Knee is pretty sore today.  We did

stop her Toradol and it seems a bit more painful today. No chest pain or

shortness of breath.  Not feeling dizzy or lightheaded.

 

OBJECTIVE:  VITAL SIGNS:  Temperature 37.0.  Vital signs stable.  Some mild

hypertension.

 

PHYSICAL EXAMINATION:

GENERAL:  Physical examination reveals a healthy pleasant elderly female. 

She is sitting up in bed and looks pretty comfortable.

LUNGS:  Clear to auscultation.

HEART:  Regular rate and rhythm.

ABDOMEN:  Soft, nontender, nondistended.

EXTREMITIES:  Grossly neurovascularly intact except as follows.

 

Examination of the right lower extremity reveals the leg to be well aligned. 

Dressing is clean, dry and intact.  She can dorsiflex and plantarflex her

foot appropriately.  She is neurologically intact.

 

LABS:  Creatinine improved at 1.20. Otherwise, electrolytes stable.

 

ASSESSMENT:  An 82-year-old white female postop day 2 from right total knee

replacement, doing pretty well.  She is pretty painful but we have been

really limiting her pain medicines to avoid confusion.  Creatinine has

improved.

 

PLAN:

1.  DVT prophylaxis including thigh-high TEDs, SCDs, and aspirin twice a day.

2.  PT/OT.  Weightbearing as tolerated.  Right total knee protocol.

3.  Pain control.  We are going to add Toradol back in for another 24 hours

and see if we can help her with her pain.

4.  Disposition:  She is hoping to go to [facility] for a brief rehab stay. 

At this point she has been denied rehab and we may need to look into skilled

nursing.

## 2017-02-02 NOTE — HOSPITALIST PROGRESS NOTE
Hospitalist Progress Note


Date of Service


Feb 2, 2017.





Subjective


Pt evaluation today including:  conversation w/ patient, physical exam, lab 

review, review of inpatient medication list


C/o more pain this morning and nausea.  She's requiring tramadol on top of 

Tylenol and Toradol.  Per RN, she's been mildly confused throughout the day.  

She admits she felt confused this morning, but thinks she feels better now.





Medications





 Current Inpatient Medications








 Medications


  (Trade)  Dose


 Ordered  Sig/Ron


 Route  Start Time


 Stop Time Status Last Admin


Dose Admin


 


 Miscellaneous


  (Remove


 Transderm-Scop


 Patch)  1 ea  Q72H


 N/A  2/3/17 06:00


 2/3/17 06:01   


 


 


 Morphine Sulfate


  (MoRPHine


 SULFATE INJ)  2 mg  Q1H  PRN


 IV  1/31/17 13:45


 2/14/17 13:44   


 


 


 Acetaminophen


  (Tylenol Tab)  1,000 mg  Q8H


 PO  1/31/17 18:00


 3/2/17 17:59  2/2/17 12:54


1,000 MG


 


 Magnesium


 Hydroxide


  (Milk Of


 Magnesia Susp)  30 ml  Q6H  PRN


 PO  1/31/17 13:45


 3/2/17 13:44   


 


 


 Bisacodyl


  (Dulcolax Supp)  10 mg  DAILY  PRN


 NV  1/31/17 13:45


 3/2/17 13:44   


 


 


 Docusate Sodium


  (coLACE CAP)  100 mg  BID


 PO  1/31/17 21:00


 3/2/17 20:59  2/2/17 08:14


100 MG


 


 Diphenhydramine


 HCl


  (Benadryl Cap)  25 mg  Q8H  PRN


 PO  1/31/17 13:45


 3/2/17 13:44   


 


 


 Diphenhydramine


 HCl


  (Benadryl Inj)  25 mg  Q8H  PRN


 IV  1/31/17 13:45


 3/2/17 13:44   


 


 


 Al Hydrox/Mg


 Hydrox/Simethicone


  (Maalox Max Susp)  15 ml  Q4H  PRN


 PO  1/31/17 13:45


 3/2/17 13:44   


 


 


 Zolpidem Tartrate


  (Ambien Tab)  5 mg  HSZ  PRN


 PO  1/31/17 13:45


 3/2/17 13:44   


 


 


 Multivitamins


  (Multivitamin


 Tab)  1 tab  QAM


 PO  2/1/17 09:00


 3/3/17 08:59  2/2/17 07:22


1 TAB


 


 Ondansetron HCl


  (Zofran Inj)  4 mg  Q6H  PRN


 IV  1/31/17 13:45


 3/2/17 13:44  2/2/17 10:04


4 MG


 


 Metoclopramide HCl


  (Reglan Inj)  10 mg  Q6H  PRN


 IV  1/31/17 13:45


 3/2/17 13:44   


 


 


 Ferrous Gluconate


  (Ferrous


 Gluconate Tab)  324 mg  TIDM


 PO  1/31/17 17:45


 3/2/17 17:59  2/2/17 12:54


324 MG


 


 Pantoprazole


 Sodium


  (Protonix Tab)  40 mg  QAM


 PO  2/1/17 09:00


 3/3/17 08:59  2/2/17 07:23


40 MG


 


 Aspirin


  (Ecotrin Tab)  325 mg  BID


 PO  1/31/17 21:00


 3/2/17 20:59  2/2/17 08:14


325 MG


 


 Tramadol HCl


  (Ultram Tab)  1 tablet


 for pain


 rating...  Q4H  PRN


 PO  1/31/17 13:45


 3/2/17 13:44  2/2/17 07:22


50 MG


 


 Escitalopram


 Oxalate


  (Lexapro Tab)  10 mg  QAM


 PO  2/1/17 09:00


 3/3/17 08:59  2/2/17 07:23


10 MG


 


 Levothyroxine


 Sodium


  (Synthroid Tab)  88 mcg  DAILYBB


 PO  2/1/17 06:00


 3/3/17 05:59  2/2/17 05:29


88 MCG


 


 Metoprolol


 Tartrate


  (Lopressor Tab)  25 mg  BID


 PO  1/31/17 21:00


 3/2/17 20:59  2/2/17 08:13


25 MG


 


 Simvastatin


  (Zocor Tab)  40 mg  HS


 PO  1/31/17 21:00


 3/2/17 20:59  2/1/17 21:21


40 MG


 


 Tolterodine


 Tartrate


  (Detrol LA Cap)  2 mg  HS


 PO  1/31/17 21:00


 3/2/17 20:59  2/1/17 21:21


2 MG


 


 Miscellaneous


 Information


  (Order Awaiting


 Action)  1 ea  QS


 N/A  1/31/17 16:00


 3/2/17 15:59   


 


 


 Ketorolac


 Tromethamine


  (Toradol Inj)  15 mg  Q6H


 IV  2/2/17 08:00


 2/3/17 02:01  2/2/17 14:33


15 MG











Objective


Vital Signs











  Date Time  Temp Pulse Resp B/P Pulse Ox O2 Delivery O2 Flow Rate FiO2


 


2/2/17 07:00      Room Air  


 


2/2/17 06:01 37.0 74 16 156/84 94 Room Air  


 


2/2/17 03:13  71  166/82 92 Room Air  


 


2/2/17 00:29      Room Air  


 


2/1/17 23:05 36.8 76 16 172/80 93 Room Air  


 


2/1/17 20:51  73  147/75    


 


2/1/17 19:20      Room Air  


 


2/1/17 15:11 36.8 73 16 151/80 95 Room Air  











Physical Exam


General Appearance:  no apparent distress


Eyes:  sclerae normal


Neck:  no JVD


Respiratory/Chest:  lungs clear, no respiratory distress


Cardiovascular:  regular rate, rhythm


Abdomen:  normal bowel sounds, non tender, soft


Extremities:  no pedal edema


Neurologic/Psychiatric:  alert, oriented x 3


Skin:  normal color, warm/dry





Laboratory Results





Last 24 Hours








Test


  2/2/17


05:11


 


Sodium Level 134 mmol/L 


 


Potassium Level 4.1 mmol/L 


 


Chloride Level 102 mmol/L 


 


Carbon Dioxide Level 26 mmol/L 


 


Anion Gap 6.0 mmol/L 


 


Blood Urea Nitrogen 21 mg/dl 


 


Creatinine 1.20 mg/dl 


 


Est Creatinine Clear Calc


Drug Dose 31.4 ml/min 


 


 


Estimated GFR (


American) 48.7 


 


 


Estimated GFR (Non-


American 42.1 


 


 


BUN/Creatinine Ratio 17.6 


 


Random Glucose 145 mg/dl 


 


Calcium Level 8.2 mg/dl 











Assessment and Plan





82-year-old female status post right TKA-postop day #2, mild renal insufficiency

, and now is mildly confused today. 





S/P right TKA


-pain management, DVT prophylaxis, PT per primary team


-Plan is for d/c to rehab vs SNF





Mild confusion


-?mild delirium related to pain meds.  Difficult to avoid given that her pain 

isn't well controlled with non-opioid meds alone.


-UA collected yesterday showed >30 WBCs and was leukocyte esterase positive. It 

also had many epithelial cells and I suspect it was a poor specimen.  She has 

no other clinical signs or symptoms of a UTI, but if present, this could 

contribute to delirium.  If she developed any other signs of infection, I would 

send a culture and start abx. 





Mild acute renal insufficiency


-Resolved with IV fluids


-Recheck BMP in the AM





Hypertension


-Continue Lopressor 25 mg twice daily





Urinary incontinence


-Continue Detrol 2 mg at night





Hyperlipidemia


-Continue Zocor 40 mg at night





Hypothyroidism


-Continue Synthroid 88 g daily





Depression


-Continue Lexapro 10 mg in the morning





Thank you for allowing us to participate in Ms. Macario's care.  We will continue 

to follow her with you.

## 2017-02-03 VITALS
SYSTOLIC BLOOD PRESSURE: 157 MMHG | HEART RATE: 67 BPM | DIASTOLIC BLOOD PRESSURE: 80 MMHG | TEMPERATURE: 98.24 F | OXYGEN SATURATION: 96 %

## 2017-02-03 VITALS
DIASTOLIC BLOOD PRESSURE: 74 MMHG | TEMPERATURE: 98.24 F | SYSTOLIC BLOOD PRESSURE: 131 MMHG | HEART RATE: 66 BPM | OXYGEN SATURATION: 97 %

## 2017-02-03 VITALS — SYSTOLIC BLOOD PRESSURE: 131 MMHG | OXYGEN SATURATION: 97 % | DIASTOLIC BLOOD PRESSURE: 74 MMHG | HEART RATE: 66 BPM

## 2017-02-03 LAB
ANION GAP SERPL CALC-SCNC: 6 MMOL/L (ref 3–11)
APPEARANCE UR: CLEAR
BASOPHILS # BLD: 0.02 K/UL (ref 0–0.2)
BASOPHILS NFR BLD: 0.2 %
BILIRUB UR-MCNC: (no result) MG/DL
BUN SERPL-MCNC: 22 MG/DL (ref 7–18)
BUN/CREAT SERPL: 16 (ref 10–20)
CALCIUM SERPL-MCNC: 8.3 MG/DL (ref 8.5–10.1)
CHLORIDE SERPL-SCNC: 98 MMOL/L (ref 98–107)
CO2 SERPL-SCNC: 29 MMOL/L (ref 21–32)
COLOR UR: YELLOW
COMPLETE: YES
CREAT CL PREDICTED SERPL C-G-VRATE: 26.9 ML/MIN
CREAT SERPL-MCNC: 1.4 MG/DL (ref 0.6–1.2)
EOSINOPHIL NFR BLD AUTO: 217 K/UL (ref 130–400)
GLUCOSE SERPL-MCNC: 89 MG/DL (ref 70–99)
HCT VFR BLD CALC: 29.1 % (ref 37–47)
IG%: 0.2 %
IMM GRANULOCYTES NFR BLD AUTO: 17.7 %
LYMPHOCYTES # BLD: 1.82 K/UL (ref 1.2–3.4)
MANUAL MICROSCOPIC REQUIRED?: NO
MCH RBC QN AUTO: 30.5 PG (ref 25–34)
MCHC RBC AUTO-ENTMCNC: 33.3 G/DL (ref 32–36)
MCV RBC AUTO: 91.5 FL (ref 80–100)
MONOCYTES NFR BLD: 12.7 %
NEUTROPHILS # BLD AUTO: 1.5 %
NEUTROPHILS NFR BLD AUTO: 67.7 %
NITRITE UR QL STRIP: (no result)
PH UR STRIP: 5 [PH] (ref 4.5–7.5)
PMV BLD AUTO: 10 FL (ref 7.4–10.4)
POTASSIUM SERPL-SCNC: 4.5 MMOL/L (ref 3.5–5.1)
RBC # BLD AUTO: 3.18 M/UL (ref 4.2–5.4)
REVIEW REQ?: NO
SODIUM SERPL-SCNC: 133 MMOL/L (ref 136–145)
SP GR UR STRIP: 1.01 (ref 1–1.03)
URINE BILL WITH OR WITHOUT MIC: (no result)
URINE EPITHELIAL CELL AUTO: >30 /LPF (ref 0–5)
UROBILINOGEN UR-MCNC: (no result) MG/DL
WBC # BLD AUTO: 10.28 K/UL (ref 4.8–10.8)

## 2017-02-03 RX ADMIN — TRAMADOL HYDROCHLORIDE PRN MG: 50 TABLET, COATED ORAL at 14:07

## 2017-02-03 RX ADMIN — FERROUS GLUCONATE SCH MG: 324 TABLET ORAL at 14:07

## 2017-02-03 RX ADMIN — ESCITALOPRAM OXALATE SCH MG: 10 TABLET, FILM COATED ORAL at 08:57

## 2017-02-03 RX ADMIN — DOCUSATE SODIUM SCH MG: 100 CAPSULE, LIQUID FILLED ORAL at 08:57

## 2017-02-03 RX ADMIN — Medication SCH TAB: at 08:55

## 2017-02-03 RX ADMIN — ALUMINUM ZIRCONIUM TRICHLOROHYDREX GLY SCH EA: 0.2 STICK TOPICAL at 12:26

## 2017-02-03 RX ADMIN — ONDANSETRON PRN MG: 2 INJECTION INTRAMUSCULAR; INTRAVENOUS at 08:50

## 2017-02-03 RX ADMIN — METOPROLOL TARTRATE SCH MG: 25 TABLET, FILM COATED ORAL at 08:55

## 2017-02-03 RX ADMIN — FERROUS GLUCONATE SCH MG: 324 TABLET ORAL at 08:57

## 2017-02-03 RX ADMIN — PANTOPRAZOLE SCH MG: 40 TABLET, DELAYED RELEASE ORAL at 08:55

## 2017-02-03 RX ADMIN — KETOROLAC TROMETHAMINE SCH MG: 15 INJECTION INTRAMUSCULAR; INTRAVENOUS at 02:35

## 2017-02-03 RX ADMIN — Medication SCH MG: at 08:56

## 2017-02-03 RX ADMIN — LEVOTHYROXINE SODIUM SCH MCG: 88 TABLET ORAL at 04:17

## 2017-02-03 RX ADMIN — ALUMINUM ZIRCONIUM TRICHLOROHYDREX GLY SCH EA: 0.2 STICK TOPICAL at 04:18

## 2017-02-03 RX ADMIN — ACETAMINOPHEN SCH MG: 500 TABLET, COATED ORAL at 14:06

## 2017-02-03 RX ADMIN — ACETAMINOPHEN SCH MG: 500 TABLET, COATED ORAL at 02:35

## 2017-02-03 NOTE — HOSPITALIST PROGRESS NOTE
Hospitalist Progress Note


Date of Service


Feb 3, 2017.


 (Violetta Noble PA-C)





Subjective


Pt evaluation today including:  conversation w/ patient, physical exam, chart 

review, lab review, review of inpatient medication list


Patient says that she is feeling "eh, ok" this morning.  She is eating 

breakfast.  She is complaining of mild nausea.  No actual vomiting.  Denies 

abdominal pain.  Had a bowel movement yesterday.  Denies any fever or chills.  

No chest pain or shortness of breath.  Pain in the knee is bearable.  Denies 

any urinary symptoms.





   Additional Comments:


6 system review negative. Please see pertinent positives in the history of 

present illness section.


 (Violetta Noble PA-C)





Objective


Vital Signs











  Date Time  Temp Pulse Resp B/P Pulse Ox O2 Delivery O2 Flow Rate FiO2


 


2/3/17 07:25 36.8 67 20 157/80 96 Room Air  


 


2/3/17 04:18      Room Air  


 


2/2/17 23:45 36.8 77 16 106/64 95 Room Air  


 


2/2/17 20:30  73  133/78    


 


2/2/17 15:42      Room Air  


 


2/2/17 15:25 36.9 70 16 124/67 94 Room Air  


 


2/2/17 15:08  79   97   








 (Violetta Noble PA-C)





Physical Exam


General Appearance:  no apparent distress


Eyes:  EOMI


Neck:  no JVD


Respiratory/Chest:  lungs clear


Cardiovascular:  regular rate, rhythm


Abdomen:  normal bowel sounds, non tender, soft


Extremities:  no pedal edema


Neurologic/Psychiatric:  no motor/sensory deficits, alert (alert and answering 

questions appropriately)


Skin:  warm/dry


 (Violetta Noble PA-C)





Laboratory Results





Last 24 Hours








Test


  2/3/17


05:10


 


White Blood Count 10.28 K/uL 


 


Red Blood Count 3.18 M/uL 


 


Hemoglobin 9.7 g/dL 


 


Hematocrit 29.1 % 


 


Mean Corpuscular Volume 91.5 fL 


 


Mean Corpuscular Hemoglobin 30.5 pg 


 


Mean Corpuscular Hemoglobin


Concent 33.3 g/dl 


 


 


Platelet Count 217 K/uL 


 


Mean Platelet Volume 10.0 fL 


 


Neutrophils (%) (Auto) 67.7 % 


 


Lymphocytes (%) (Auto) 17.7 % 


 


Monocytes (%) (Auto) 12.7 % 


 


Eosinophils (%) (Auto) 1.5 % 


 


Basophils (%) (Auto) 0.2 % 


 


Neutrophils # (Auto) 6.96 K/uL 


 


Lymphocytes # (Auto) 1.82 K/uL 


 


Monocytes # (Auto) 1.31 K/uL 


 


Eosinophils # (Auto) 0.15 K/uL 


 


Basophils # (Auto) 0.02 K/uL 


 


RDW Standard Deviation 49.5 fL 


 


RDW Coefficient of Variation 14.7 % 


 


Immature Granulocyte % (Auto) 0.2 % 


 


Immature Granulocyte # (Auto) 0.02 K/uL 


 


Sodium Level 133 mmol/L 


 


Potassium Level 4.5 mmol/L 


 


Chloride Level 98 mmol/L 


 


Carbon Dioxide Level 29 mmol/L 


 


Anion Gap 6.0 mmol/L 


 


Blood Urea Nitrogen 22 mg/dl 


 


Creatinine 1.40 mg/dl 


 


Est Creatinine Clear Calc


Drug Dose 26.9 ml/min 


 


 


Estimated GFR (


American) 40.5 


 


 


Estimated GFR (Non-


American 34.9 


 


 


BUN/Creatinine Ratio 16.0 


 


Random Glucose 89 mg/dl 


 


Calcium Level 8.3 mg/dl 








 (Violetta Noble, PACarolC)





Assessment and Plan





(1) HTN (hypertension)


Assessment & Plan:  Stable


-Continue Lopressor 25 mg twice daily





(2) Hyperlipidemia


Assessment & Plan:  Continue Zocor 40 mg daily





(3) Hypothyroidism


Assessment & Plan:  Continue Synthroid 88 g daily





(4) Acute renal insufficiency


Assessment & Plan:  Mild bump in creatinine this morning at 1.4-I still think 

this is likely secondary to dehydration as the patient is not eating or 

drinking well.  She is complaining of nausea.


-Encourage oral intake


-Repeat urinalysis-straight cath.  Send for culture.


-Repeat BMP in 2 days





(5) Metabolic encephalopathy


Assessment & Plan:  Patient was reportedly confused yesterday.  She is lucid on 

my exam today.  I question whether this is secondary to UTI (less likely) 

versus pain meds and hospital delirium


-Follow up urinalysis





(6) Nausea


Assessment & Plan:  ?  Etiology.  Patient had a bowel movement yesterday.   I 

do not suspect an ileus.  Still a question of UTI


-Zofran 4 mg every 6 hours as needed





(7) Depression


Assessment & Plan:  -Lexapro 10 mg daily





(8) Urinary incontinence


Assessment & Plan:  -Detrol 5 mg daily





(9) Right Knee DJD


Assessment & Plan:  Status post right TKA 1/31


-pain management, DVT prophylaxis, PT per primary team





pt is stable for d/c from a medial standpoint.  Encourage PO intake.  Avoid 

NSAIDS for renal fxn.  Follow up urine culture.  Repeat BMP in 2 days


 (Violetta Noble PA-C)








Patient was seen and examined.  Case was discussed with Violetta Noble PA-C.  I 

agree with her assessment and plan as written above.  Patient is medically 

stable for discharge to [facility].


 (Nacho Madden MD)

## 2017-02-03 NOTE — DISCHARGE INSTRUCTIONS
Discharge Instructions


Admission


Reason for Admission:  Right Knee Osteoarthritis





Discharge


Discharge Diagnosis / Problem:  s/p R TKA





Discharge Goals


Goal(s):  Improve function





Activity Recommendations


Activity Limitations:  per Instructions/Follow-up section





.





Instructions / Follow-Up


Instructions / Follow-Up


 Encourage fluid intake.  


Avoid NSAIDS for kidney function.  


Follow up urine culture.  


Repeat BMP in 2 days





Current Hospital Diet


Patient's current hospital diet: Regular Diet





Discharge Diet


Recommended Diet:  Regular Diet





Procedures


Procedures Performed:  


Right total knee arthroplasty





Pending Studies


Studies pending at discharge:  no





Medical Emergencies








.


Who to Call and When:





Medical Emergencies:  If at any time you feel your situation is an emergency, 

please call 911 immediately.





.





Non-Emergent Contact


Non-Emergency issues call your:  Primary Care Provider, Surgeon





.


.





"Provider Documentation" section prepared by Violetta Noble.





VTE Core Measure


Inpt VTE Proph given/why not?:  T.E.D. Stockings, SCD's

## 2017-02-03 NOTE — PROGRESS NOTE
DATE: 02/03/2017

 

DATE:  02/03/2017.  

 

SUBJECTIVE:  An 82-year-old white female postop day 3 from a right total knee

replacement.  She is doing pretty well.  Pain seems to be a little bit better

the past 24 hours since  we added some Toradol.  No chest pain or shortness

of breath.  Not feeling dizzy or lightheaded.

 

OBJECTIVE:  VITAL SIGNS:  Temperature is 36.8.  Vital signs stable.

 

PHYSICAL EXAMINATION:

GENERAL:  A pleasant elderly female.  She is lying in bed pretty comfortable.

 I did have to wake her this morning.

LUNGS:  Clear to auscultation.

HEART:  Regular rate and rhythm.

ABDOMEN:  Soft, nontender, nondistended.

EXTREMITY EXAMINATION:  Grossly neurovascularly intact except as follows:

Examination of the right lower extremity reveals the leg to be well aligned. 

Dressing is clean, dry and intact.  Calf is soft and supple.  She can

dorsiflex and plantarflex her foot appropriately.  She has a good straight

leg raise.

 

ASSESSMENT:  An 82-year-old female postop day 3 from right knee replacement,

doing pretty well.  Pain seems to be improved.  She is neurologically intact.

 

PLAN:

1. DVT prophylaxis including thigh-high TEDs, SCDs, and aspirin twice a day.

2. PT/OT.  Weightbearing as tolerated.  Right total knee protocol.

3. Pain control, doing pretty well with current pain regimen.

4. Disposition:  She is planning to be discharged to [facility] after

therapy today.

## 2017-02-07 NOTE — DISCHARGE SUMMARY
ADMITTING PHYSICIAN AND SURGEON:  Dr. Santos.

 

ADMITTING DIAGNOSIS:  Right knee degenerative joint disease.

 

SURGERY PERFORMED:  Right total knee arthroplasty.

 

SECONDARY DIAGNOSES:  Elevated cholesterol, recent pneumonia, hypothyroidism,

low back pain, sciatica, gastroesophageal reflux disease, hiatal hernia.

 

HISTORY AND PHYSICAL EXAMINATION:  Well documented in the patient's chart.

 

CONSULTS:  Dr. Lopez postoperative medical management.

 

HOSPITAL COURSE:  The patient was admitted on 01/31/2017 underwent total knee

arthroplasty.  She tolerated procedure well.  There were no complications. 

She was transferred to the PACU postoperatively and later to the orthopedic

floor for further care.  She was given Ancef for antibiotic prophylaxis, WILD

stockings, SCDs and aspirin for DVT prophylaxis.  Hemoglobin, hematocrit and

vital signs were monitored during her hospital stay and remained stable.  She

developed some postoperative anemia with a hemoglobin down to 9.7, did not

require any blood transfusions.  She had elevated creatinine postoperatively;

her Toradol was discontinued and this improved.  There were no complications.

 

By postoperative day 3, she was tolerating a general diet, pain was

controlled with oral pain medicine.  She was participating in physical

therapy and had no signs or symptoms of deep vein thrombosis.

 

On postoperative day 3, she was discharged to a skilled nursing facility. 

She had been denied stay at rehab facility.  She was given printed discharge

instructions including prescriptions for extra strength Tylenol, aspirin 325

mg b.i.d., iron supplement and tramadol.  Continue her home medications with

the exception of her home dose of aspirin, continue physical therapy,

weightbearing as tolerated, WILD stockings.  Follow up in 10-12 days or sooner

if there are any problems or concerns.

## 2017-06-12 ENCOUNTER — HOSPITAL ENCOUNTER (OUTPATIENT)
Dept: HOSPITAL 45 - C.MAMM | Age: 82
Discharge: HOME | End: 2017-06-12
Attending: OBSTETRICS & GYNECOLOGY
Payer: COMMERCIAL

## 2017-06-12 DIAGNOSIS — Z12.31: Primary | ICD-10-CM

## 2017-06-13 NOTE — MAMMOGRAPHY REPORT
BILATERAL DIGITAL SCREENING MAMMOGRAM WITH CAD: 6/12/2017

CLINICAL HISTORY: Routine screening.  Patient has no complaints.  





TECHNIQUE:  Bilateral CC and MLO views were obtained.  Current study was also evaluated with a Comput
er Aided Detection (CAD) system.  



COMPARISON: Comparison is made to exams dated:  6/9/2016 mammogram, 6/9/2015 mammogram, 5/14/2014 [facility]o
mogram, 5/13/2013 mammogram, 5/11/2012 mammogram, and 5/19/2014 mammogram - [facility]
ter.   



BREAST COMPOSITION:  The tissue of both breasts is heterogeneously dense, which may obscure small mas
ses.  



FINDINGS:  There are mild vascular calcifications in the breasts.  The parenchymal pattern is unchang
ed. No developing mass, architectural distortion or cluster of suspicious microcalcifications is seen
 in either breast.  





IMPRESSION:  ACR BI-RADS CATEGORY 2: BENIGN

There is no mammographic evidence of malignancy. A 1 year screening mammogram is recommended.  The pa
tient will receive written notification of the results.  





Approximately 10% of breast cancers are not detected with mammography. A negative mammographic report
 should not delay biopsy if a clinically suggestive mass is present.



Sarah Baker M.D.          

ay/:6/12/2017 14:54:56  



Imaging Technologist: Hailey SERRATO(R)(M), [facility]

letter sent: Normal 1/2  

BI-RADS Code: ACR BI-RADS Category 2: Benign

## 2017-07-24 ENCOUNTER — HOSPITAL ENCOUNTER (OUTPATIENT)
Dept: HOSPITAL 45 - C.PAPS | Age: 82
Discharge: HOME | End: 2017-07-24
Attending: OBSTETRICS & GYNECOLOGY
Payer: COMMERCIAL

## 2017-07-24 DIAGNOSIS — L90.0: ICD-10-CM

## 2017-07-24 DIAGNOSIS — N81.10: Primary | ICD-10-CM

## 2017-08-28 ENCOUNTER — HOSPITAL ENCOUNTER (OUTPATIENT)
Dept: HOSPITAL 45 - C.LAB1850 | Age: 82
Discharge: HOME | End: 2017-08-28
Attending: INTERNAL MEDICINE
Payer: COMMERCIAL

## 2017-08-28 ENCOUNTER — HOSPITAL ENCOUNTER (OUTPATIENT)
Dept: HOSPITAL 45 - C.RAD1850 | Age: 82
Discharge: HOME | End: 2017-08-28
Attending: INTERNAL MEDICINE
Payer: COMMERCIAL

## 2017-08-28 DIAGNOSIS — M85.872: ICD-10-CM

## 2017-08-28 DIAGNOSIS — N18.3: Primary | ICD-10-CM

## 2017-08-28 DIAGNOSIS — R73.03: ICD-10-CM

## 2017-08-28 DIAGNOSIS — M79.672: Primary | ICD-10-CM

## 2017-08-28 DIAGNOSIS — M79.672: ICD-10-CM

## 2017-08-28 LAB
ALT SERPL-CCNC: 28 U/L (ref 12–78)
ANION GAP SERPL CALC-SCNC: 3 MMOL/L (ref 3–11)
AST SERPL-CCNC: 17 U/L (ref 15–37)
BUN SERPL-MCNC: 16 MG/DL (ref 7–18)
BUN/CREAT SERPL: 13.7 (ref 10–20)
CALCIUM SERPL-MCNC: 9.5 MG/DL (ref 8.5–10.1)
CHLORIDE SERPL-SCNC: 106 MMOL/L (ref 98–107)
CHOLEST/HDLC SERPL: 5.1 {RATIO}
CO2 SERPL-SCNC: 31 MMOL/L (ref 21–32)
CREAT SERPL-MCNC: 1.2 MG/DL (ref 0.6–1.2)
EST. AVERAGE GLUCOSE BLD GHB EST-MCNC: 123 MG/DL
GLUCOSE SERPL-MCNC: 95 MG/DL (ref 70–99)
GLUCOSE UR QL: 27 MG/DL
KETONES UR QL STRIP: 44 MG/DL
NITRITE UR QL STRIP: 328 MG/DL (ref 0–150)
PH UR: 137 MG/DL (ref 0–200)
PHOSPHATE SERPL-MCNC: 3.3 MG/DL (ref 2.5–4.9)
POTASSIUM SERPL-SCNC: 5.1 MMOL/L (ref 3.5–5.1)
SODIUM SERPL-SCNC: 140 MMOL/L (ref 136–145)
VERY LOW DENSITY LIPOPROT CALC: 66 MG/DL

## 2017-08-28 NOTE — DIAGNOSTIC IMAGING REPORT
LEFT FOOT MIN 3 VIEWS ROUTINE



CLINICAL HISTORY: 83 years-old Female presenting with M79.672 Foot pain, left. 



TECHNIQUE: Frontal, oblique, and lateral views of the left foot were obtained. 



COMPARISON:  None.



FINDINGS:

Degenerative changes of the articulation of the medial cuneiform with the first

metatarsal. Additional degenerative changes of the tarsometatarsal

articulations. Posterior Mediport deformity of the neck of the fifth metatarsal.

Osteopenia suggested. No acute fracture or subluxation. Bone spur noted at the

inferior calcaneus. Spurring of the anterior process of the talus along the

dorsal aspect.



IMPRESSION:

Multifocal degenerative changes of the left foot detailed above. No acute

osseous injury.







Electronically signed by:  Edwin Suero M.D.

8/28/2017 11:29 AM



Dictated Date/Time:  8/28/2017 11:28 AM

## 2017-08-30 ENCOUNTER — HOSPITAL ENCOUNTER (OUTPATIENT)
Dept: HOSPITAL 45 - C.LABSPEC | Age: 82
Discharge: HOME | End: 2017-08-30
Attending: INTERNAL MEDICINE
Payer: COMMERCIAL

## 2017-08-30 DIAGNOSIS — N18.3: Primary | ICD-10-CM

## 2017-08-30 LAB
APPEARANCE UR: (no result)
BILIRUB UR-MCNC: (no result) MG/DL
COLOR UR: (no result)
CREAT UR-MCNC: 140 MG/DL
MANUAL MICROSCOPIC REQUIRED?: NO
NITRITE UR QL STRIP: (no result)
PH UR STRIP: 6 [PH] (ref 4.5–7.5)
PROT UR STRIP-MCNC: 23.5 MG/DL (ref 0–11.9)
REVIEW REQ?: NO
SP GR UR STRIP: 1.02 (ref 1–1.03)
URINE BILL WITH OR WITHOUT MIC: (no result)
URINE EPITHELIAL CELL AUTO: >30 /LPF (ref 0–5)
URINE PROTIEN/CREAT RATIO: 0.2 (ref 0–0.2)
UROBILINOGEN UR-MCNC: (no result) MG/DL

## 2018-02-28 ENCOUNTER — HOSPITAL ENCOUNTER (OUTPATIENT)
Dept: HOSPITAL 45 - C.LAB1850 | Age: 83
Discharge: HOME | End: 2018-02-28
Attending: INTERNAL MEDICINE
Payer: COMMERCIAL

## 2018-02-28 DIAGNOSIS — N18.3: ICD-10-CM

## 2018-02-28 DIAGNOSIS — E03.9: ICD-10-CM

## 2018-02-28 DIAGNOSIS — R73.01: ICD-10-CM

## 2018-02-28 DIAGNOSIS — E78.00: Primary | ICD-10-CM

## 2018-02-28 LAB
ALT SERPL-CCNC: 29 U/L (ref 12–78)
AST SERPL-CCNC: 16 U/L (ref 15–37)
BUN SERPL-MCNC: 21 MG/DL (ref 7–18)
CALCIUM SERPL-MCNC: 9.4 MG/DL (ref 8.5–10.1)
CO2 SERPL-SCNC: 30 MMOL/L (ref 21–32)
CREAT SERPL-MCNC: 1.28 MG/DL (ref 0.6–1.2)
GLUCOSE SERPL-MCNC: 93 MG/DL (ref 70–99)
HBA1C MFR BLD: 6.1 % (ref 4.5–5.6)
KETONES UR QL STRIP: 67 MG/DL
PH UR: 147 MG/DL (ref 0–200)
POTASSIUM SERPL-SCNC: 4.1 MMOL/L (ref 3.5–5.1)
SODIUM SERPL-SCNC: 139 MMOL/L (ref 136–145)

## 2018-08-13 ENCOUNTER — HOSPITAL ENCOUNTER (EMERGENCY)
Dept: HOSPITAL 45 - C.EDB | Age: 83
Discharge: HOME | End: 2018-08-13
Payer: COMMERCIAL

## 2018-08-13 ENCOUNTER — HOSPITAL ENCOUNTER (OUTPATIENT)
Dept: HOSPITAL 45 - C.CPL | Age: 83
Discharge: HOME | End: 2018-08-13
Attending: ORTHOPAEDIC SURGERY
Payer: COMMERCIAL

## 2018-08-13 VITALS — DIASTOLIC BLOOD PRESSURE: 92 MMHG | OXYGEN SATURATION: 98 % | HEART RATE: 65 BPM | SYSTOLIC BLOOD PRESSURE: 161 MMHG

## 2018-08-13 VITALS — HEIGHT: 62.01 IN

## 2018-08-13 DIAGNOSIS — Z88.2: ICD-10-CM

## 2018-08-13 DIAGNOSIS — S50.02XA: ICD-10-CM

## 2018-08-13 DIAGNOSIS — I10: ICD-10-CM

## 2018-08-13 DIAGNOSIS — Z01.818: Primary | ICD-10-CM

## 2018-08-13 DIAGNOSIS — Z23: ICD-10-CM

## 2018-08-13 DIAGNOSIS — R32: ICD-10-CM
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
## 2018-08-13 DIAGNOSIS — E03.9: ICD-10-CM

## 2018-08-13 DIAGNOSIS — E78.5: ICD-10-CM

## 2018-08-13 DIAGNOSIS — F32.9: ICD-10-CM

## 2018-08-13 DIAGNOSIS — Z79.899: ICD-10-CM

## 2018-08-13 DIAGNOSIS — S01.81XA: Primary | ICD-10-CM

## 2018-08-13 LAB
BASOPHILS # BLD: 0.03 K/UL (ref 0–0.2)
BASOPHILS NFR BLD: 0.4 %
BUN SERPL-MCNC: 18 MG/DL (ref 7–18)
CALCIUM SERPL-MCNC: 9.1 MG/DL (ref 8.5–10.1)
CO2 SERPL-SCNC: 28 MMOL/L (ref 21–32)
CREAT SERPL-MCNC: 1.29 MG/DL (ref 0.6–1.2)
EOS ABS #: 0.16 K/UL (ref 0–0.5)
EOSINOPHIL NFR BLD AUTO: 249 K/UL (ref 130–400)
GLUCOSE SERPL-MCNC: 103 MG/DL (ref 70–99)
HCT VFR BLD CALC: 43.2 % (ref 37–47)
HGB BLD-MCNC: 14.2 G/DL (ref 12–16)
IG#: 0.01 K/UL (ref 0–0.02)
IMM GRANULOCYTES NFR BLD AUTO: 39.2 %
INR PPP: 0.9 (ref 0.9–1.1)
LYMPHOCYTES # BLD: 2.74 K/UL (ref 1.2–3.4)
MCH RBC QN AUTO: 30.8 PG (ref 25–34)
MCHC RBC AUTO-ENTMCNC: 32.9 G/DL (ref 32–36)
MCV RBC AUTO: 93.7 FL (ref 80–100)
MONO ABS #: 0.69 K/UL (ref 0.11–0.59)
MONOCYTES NFR BLD: 9.9 %
NEUT ABS #: 3.36 K/UL (ref 1.4–6.5)
NEUTROPHILS # BLD AUTO: 2.3 %
NEUTROPHILS NFR BLD AUTO: 48.1 %
PMV BLD AUTO: 9.9 FL (ref 7.4–10.4)
POTASSIUM SERPL-SCNC: 4.7 MMOL/L (ref 3.5–5.1)
PTT PATIENT: 25.9 SECONDS (ref 21–31)
RED CELL DISTRIBUTION WIDTH CV: 14.5 % (ref 11.5–14.5)
RED CELL DISTRIBUTION WIDTH SD: 49.3 FL (ref 36.4–46.3)
SODIUM SERPL-SCNC: 139 MMOL/L (ref 136–145)
WBC # BLD AUTO: 6.99 K/UL (ref 4.8–10.8)

## 2018-08-13 NOTE — DIAGNOSTIC IMAGING REPORT
L ELBOW MIN 3 VIEWS ROUTINE



CLINICAL HISTORY: fall; L elbow trauma. Pain.



COMPARISON: None.



DISCUSSION: Considerable soft tissue edema posterior to the elbow. No evidence

for fracture or dislocation. Cortical margins are intact. No significant joint

effusion.    



IMPRESSION: Soft tissue edema posterior to the elbow. No acute bony abnormality.











The above report was generated using voice recognition software.  It may contain

grammatical, syntax or spelling errors.







Electronically signed by:  Joselito Caruso M.D.

8/13/2018 12:11 PM



Dictated Date/Time:  8/13/2018 12:10 PM

## 2018-08-13 NOTE — DIAGNOSTIC IMAGING REPORT
TWO VIEW CHEST



CLINICAL HISTORY: Preoperative examination.



FINDINGS: PA and lateral chest radiographs are compared to study dated

12/22/2016. The cardiomediastinal silhouette is unremarkable noting

atherosclerotic calcification of the thoracic aorta. There is a large hiatal

hernia. There is chronic interstitial thickening. No airspace consolidation or

pleural effusion is identified. There is no pneumothorax. The bony thorax

appears intact. Cholecystectomy clips are seen in the right upper quadrant.



IMPRESSION: 



1. No active disease in the chest.



2. Hiatal hernia.







Electronically signed by:  Scott Woodruff M.D.

8/13/2018 2:49 PM



Dictated Date/Time:  8/13/2018 2:48 PM

## 2018-08-13 NOTE — DIAGNOSTIC IMAGING REPORT
HEAD WITHOUT CONTRAST (CT)



CT DOSE: 537.48 mGy.cm



HISTORY: Trauma  fall; scalp lac



TECHNIQUE: Multiaxial CT images of the head were performed without the use of

intravenous contrast.  A dose lowering technique was utilized adhering to the

principles of ALARA.





Comparison: None.



Findings: The paranasal sinuses and mastoid air cells are clear. The calvarium

and skull base are intact. The ventricles and sulci are within normal limits.

There is no mass, hematoma, midline shift, or acute infarct.



Impression:

No acute intracranial abnormality. Left prefrontal extracranial soft tissue

edema











The above report was generated using voice recognition software.  It may contain

grammatical, syntax or spelling errors.







Electronically signed by:  Joselito Caruso M.D.

8/13/2018 12:33 PM



Dictated Date/Time:  8/13/2018 12:32 PM

## 2018-08-14 NOTE — EMERGENCY ROOM VISIT NOTE
ED Visit Note


First contact with patient:  11:17


Chief Complaint: Fall.





History of Present Illness: Ms. Macario is a 84-year-old white female who is 

brought into the ED via wheelchair for evaluation of the fall.


Patient reports she was at this hospital doing outpatient testing for upcoming 

surgery.  She reports she tripped over her shoes and fell to the floor.  When 

she fell she did strike the left side of her face and left arm on the ground 

but there was no loss of consciousness.


Currently patient is complaining of pain in the right frontal area above the 

orbit.  She describes this as sharp and deep achy.  She rates her discomfort 5/

10.  Her pain is nonradiating.  Her pain worsens with palpation.  She has not 

identified any alleviating factors related to the pain.  She has not had a 

medication for pain prior to arrival at the hospital.  She denies any 

associated visual changes, hearing changes, difficulty speaking, difficulty 

swallowing, difficulty ambulating/coordinating body movements, neck pain, chest 

pain, shortness of breath, abdominal pain, nausea, vomiting.


Additionally she complains of posterior left elbow pain.  She describes this as 

an achy sensation and sometimes throbbing also.  She rates her discomfort 2/10.

  Her pain is nonradiating.  Her pain worsens with palpation over the olecranon 

process and when she reached full extension and flexion.  She denies any 

associated shoulder pain, forearm pain, wrist pain, hand weakness/numbness/

tingling.


Additionally she denies hip and lower extremity pain, weakness/numbness/

tingling.





Review of Systems: As noted above in history of present illness.  All body 

systems were reviewed and found to be negative as noted above.





Past Medical History: 


(1) Depression


(2) HTN (hypertension)


(3) Hyperlipidemia


(4) Hypothyroidism


(5) Metabolic encephalopathy


(6) Pneumonia


(7) Urinary incontinence





Current Medications:








 Medications  Dose


 Route/Sig


 Max Daily Dose Days Date Category Dose


Instructions


 


 Aspirin Ec


  (Aspirin) 81 Mg


 Tab  81 Mg


 PO QPM


    8/6/18 Reported 


 


 Timolol Maleate


  (Timolol Maleate


  (Ophth)) 0.5 % Sol  1 Drop


 OPB QAM


    8/6/18 Reported 


 


 Fish Oil (Omega-3


 Fatty Acids)


 1,000 Mg Cap  1 Tab


 PO BID


    8/6/18 Reported 


 


 Tylenol Extra


 Strength


  (Acetaminophen)


 500 Mg Tab  1,000 Mg


 PO Q8H


   30 2/1/17 Rx  Take 3 times per day to lessen pain.


 


 Detrol LA


  (Tolterodine


 Tartrate) 2 Mg


 Capcr  1 Cap


 PO HS


   90 12/22/16 Reported 


 


 Levothyroxine


 Sodium 88 Mcg Tab  1 Tab


 PO QAM


   90 12/22/16 Reported 


 


 Lopressor


  (Metoprolol


 Tartrate) 25 Mg


 Tab  25 Mg


 PO BID


    12/22/16 Reported 


 


 Lexapro


  (Escitalopram


 Oxalate) 10 Mg Tab  10 Mg


 PO QAM


    12/22/16 Reported 


 


 Multivitamin


  (Multiple


 Vitamin) 1 Tab Tab  1 Tab


 PO QAM


    4/24/12 Reported 


 


 Restasis


  (Cyclosporine


  (Ophth)) 0.05 %


 Emu  1 Drop


 OPB BID


    4/24/12 Reported 


 


 Zocor


  (Simvastatin) 40


 Mg Tab  40 Mg


 PO HS


    3/13/07 Reported 





Allergies to Medications: Sulfa antibiotics.


Social History: Patient is not currently employed; she feels safe in her home 

environment; she denies tobacco use.


Tetanus Immunization Status: Patient reports greater than 10 years.





Physical Examination:


Vital Signs: 








  Date Time  Temp Pulse Resp B/P (MAP) Pulse Ox O2 Delivery O2 Flow Rate FiO2


 


8/13/18 14:38  65 20 161/92 98   


 


8/13/18 10:56 36.7 62 18 167/93 97 Room Air  





GENERAL: 84-year-old female in mild distress due to pain, nontoxic-appearing, 

afebrile and hemodynamically stable.


NEUROLOGICAL: Awake, alert and oriented to person, place and time.  Answering 

questions appropriately and following commands.  Good hand eye coordination.  

Cranial nerves II through XII grossly intact.  Good short-term and long-term 

recall.


SKIN: Warm, dry and pink.  Face: Patient has 2 lacerations superior to the left 

orbit.  The most superior laceration measures 1.9 cm and is full-thickness and 

the inferior one measures 1.5 cm and is full-thickness.  There is minimal 

active bleeding.


HEENT:  Atraumatic and normocephalic.  Skull: No bony deformity, bony crepitus, 

swelling or ecchymosis.  No raccoons eyes or sellers signs.  No drainage from 

the ears of the nostril; knee.  Face: Soft tissue injuries as noted above under 

SKIN.  Surrounding this area patient has a large area of ecchymosis that starts 

in the frontal area but does extend into the upper and lower orbits on the 

right.  Although there is pain directly over the laceration I do not appreciate 

any discomfort throughout the bony structures of the face including the orbits, 

zygoma Mattix, mandible or maxilla.  PERRLA.  EOMI without nystagmus.  Sclera 

white and conjunctiva pink.  No malocclusion.  No intraoral trauma.  Airway 

patent.  Speech is normal and clear.  Airway is patent.  Trachea midline.  No 

jugular venous distention.


BACK:  No tenderness over the bony cervical, thoracic and lumbar spines.  No 

bony crepitus, deformity or step-offs are palpable.  No tenderness throughout 

the paraspinous musculature.  Full range of motion of the cervical spine.  No 

CVA tenderness.  


THORAX: Lungs clear to auscultation and equal bilaterally with symmetrical 

chest wall.  No wheezing, rales or rhonchi.  No crepitus, tenderness, 

subcutaneous air or deformities noted.


HEART:  Regular rate and rhythm.  No gallops, rubs or murmurs are appreciated.


ABDOMEN: Flat, soft and nontender.  Positive bowel sounds in all quadrants.  No 

guarding, rigidity or organomegaly.


PELVIS: Stable and nontender to compression and rock.


UPPER EXTREMITIES: Left: Tenderness over the posterior aspect of the left elbow 

with moderate swelling and early ecchymosis.  No bony deformity, bony crepitus, 

swelling or ecchymosis.  No laxity throughout the elbow or shoulder.  No 

tenderness over the forearm, wrist or hand.  Moves the extremity well on 

command and with purpose.  Distal pulses, sensation and capillary refill are 

present and equal bilaterally. 


LOWER EXTREMITIES: No gross bony deformities.  No shortening or malrotation's.  

No tenderness in the hips, legs, knees, thighs, ankles or feet. Distal 

neurovascular statuses are intact and equal bilaterally.  





ED Course:


Patient is assessed as noted above.


Patient's medication list was reviewed.


Patient was offered pain medications and refused.  I did give her ice for her 

pain and swelling in her elbow and face.


Patient was given an Adacel booster IM.


Head CT: Was reviewed by myself and read by the radiologist showing no acute 

intracranial abnormality or skull fractures.  Radiologist notes left prefrontal 

extracranial soft tissue swelling.


Left Elbow X-Rays: Read by myself and the radiologist showing no acute 

fractures or dislocations.  No sail sign or fat pad elevation.  Moderate soft 

tissue edema over the posterior olecranon process.


Wound Repair: 


Complexity: Basic


Verbal consent was obtained after the risks and benefits were explained.


The skin was prepped with betadine and a sterile field set.  


Wound edges of the wound was anesthetized with a total of 3.2 ml buffered 1% 

lidocaine.


The wounds was explored for foreign bodies and none found.  


Copious irrigation was performed using sterile saline.  


With direct pressure the bleeding subsided. 


Debridement was not performed.  


The wound edges were approximated using 6-0 Ethilon with with total of 9 simple 

interrupted sutures.  


Hemostasis and excellent approximation was achieved.  


Antibacterial ointment and a sterile dressing applied.  


No complications and the patient tolerated the procedure well.


Patient's left arm was placed in a sling.


Patient's case was reviewed with Dr. Blanc; we agreed on diagnostic approach, 

treatment, disposition and plan.


Patient and her female friend were educated about today's findings and 

instructed on her treatment plan; they verbalized understanding and agreement 

with this plan.





Clinical Impression: Fall.  Laceration of the left side of the face.  Left 

elbow contusion.





Disposition: Patient discharged home in stable condition; prior to departure he 

was reassessed and subjectively reported she was feeling subjectively slightly 

better and rated her overall discomfort 4/10.





Plan:


Comfort measures, wound care, and signs of infection were discussed with the 

patient and her female friend.


Head injury and precautions were discussed with the patient and her friend.


Patient was encouraged to follow-up with PCP or return to the ED for any signs 

of infection and/or suture removal in 5-6 days.


Patient was encouraged return to the ED for any signs of head injury or any new/

concerning symptoms.